# Patient Record
Sex: MALE | Race: WHITE | NOT HISPANIC OR LATINO | Employment: FULL TIME | URBAN - METROPOLITAN AREA
[De-identification: names, ages, dates, MRNs, and addresses within clinical notes are randomized per-mention and may not be internally consistent; named-entity substitution may affect disease eponyms.]

---

## 2018-01-26 ENCOUNTER — PATIENT OUTREACH (OUTPATIENT)
Dept: INTERNAL MEDICINE | Facility: CLINIC | Age: 33
End: 2018-01-26

## 2018-01-26 NOTE — PROGRESS NOTES
[facility] is committed to your overall health.  To help you get the most out of each of your visits, we will review your information to make sure you are up to date on all of your recommended tests and/or procedures.       Your PCP   found that you may be due for:       Health Maintenance Due   Topic Date Due    TETANUS VACCINE  06/29/2002    Influenza Vaccine  08/01/2017             If you have had any of the above done at another facility, please bring the records or information with you so that your record at [facility] will be complete.  If you would like to schedule any of these, please contact me.     If you are currently taking medication, please bring it with you to your appointment for review.     Also, if you have any type of Advanced Directives, please bring them with you to your office visit so we may scan them into your chart.       Thank you for Choosing [facility] for your healthcare needs.        Additional Information  If you have questions, you can email [email or web address] or call [phone]  to talk to our [facility] staff. Remember, [facility] is NOT to be used for urgent needs. For medical emergencies, dial 911.

## 2018-01-29 ENCOUNTER — OFFICE VISIT (OUTPATIENT)
Dept: INTERNAL MEDICINE | Facility: CLINIC | Age: 33
End: 2018-01-29
Attending: FAMILY MEDICINE
Payer: COMMERCIAL

## 2018-01-29 VITALS
BODY MASS INDEX: 26.74 KG/M2 | SYSTOLIC BLOOD PRESSURE: 110 MMHG | DIASTOLIC BLOOD PRESSURE: 68 MMHG | HEART RATE: 90 BPM | WEIGHT: 186.75 LBS | HEIGHT: 70 IN

## 2018-01-29 DIAGNOSIS — Z00.00 PREVENTATIVE HEALTH CARE: Primary | ICD-10-CM

## 2018-01-29 PROCEDURE — 99999 PR PBB SHADOW E&M-EST. PATIENT-LVL III: CPT | Mod: PBBFAC,,, | Performed by: FAMILY MEDICINE

## 2018-01-29 PROCEDURE — 99395 PREV VISIT EST AGE 18-39: CPT | Mod: S$GLB,,, | Performed by: FAMILY MEDICINE

## 2018-01-29 NOTE — PROGRESS NOTES
"CHIEF COMPLAINT: Annual Exam    HISTORY OF PRESENT ILLNESS: The patient is a generally healthy 32 year-old WM.  The patient has no specific complaints today.  He recently underwent an insurance exam.  He was declined due to what he believes is elevated cholesterol.  It could've been elevated blood pressure although his blood pressure is normal today.  In any event were going to get a cholesterol panel and find out.  He does have a  at home so he is a little bit more tired than usual.     REVIEW OF SYSTEMS:  GENERAL: No fever, chills or weight loss.  SKIN: No rashes, itching or changes in color or texture of skin.  HEAD: No headaches or recent head trauma.  EYES: Visual acuity fine. No photophobia, ocular pain or diplopia.  EARS: Denies ear pain, discharge or vertigo.  NOSE: No loss of smell, no epistaxis or postnasal drip.  MOUTH & THROAT: No hoarseness or change in voice. No excessive gum bleeding.  NODES: Denies swollen glands.  CHEST: Denies KAPOOR, cyanosis, wheezing, cough and sputum production.  CARDIOVASCULAR: Denies chest pain, PND, orthopnea or reduced exercise tolerance.  ABDOMEN: Appetite fine. No weight loss. Denies diarrhea, abdominal pain, hematemesis or blood in stool.  URINARY: No flank pain, dysuria or hematuria.  PERIPHERAL VASCULAR: No claudication or cyanosis.  MUSCULOSKELETAL: No joint stiffness or swelling. Denies back pain.  NEUROLOGIC: No history of seizures, paralysis, alteration of gait or coordination.    SOCIAL HISTORY: The patient does not smoke.  The patient consumes alcohol socially.  The patient is happily . He works in [employer].  He is originally from [place].    PHYSICAL EXAMINATION:     Blood pressure 110/68, pulse 90, height 5' 10" (1.778 m), weight 84.7 kg (186 lb 11.7 oz).    APPEARANCE: Well nourished, well developed, in no acute distress.    HEAD: Normocephalic, atraumatic.  EYES: PERRL. EOMI.  Conjunctivae without injection and  anicteric  EARS: TM's " intact. Light reflex normal. No retraction or perforation.    NOSE: Mucosa pink. Airway clear.  MOUTH & THROAT: No tonsillar enlargement. No pharyngeal erythema or exudate. No stridor.  NECK: Supple.   NODES: No cervical, axillary or inguinal lymph node enlargement.  CHEST: Lungs clear to auscultation.  No retractions are noted.  No rales or rhonchi are present.  CARDIOVASCULAR: Normal S1, S2. No rubs, murmurs or gallops.  ABDOMEN: Bowel sounds normal. Not distended. Soft. No tenderness or masses.  No ascites is noted.  MUSCULOSKELETAL:  There is no clubbing, cyanosis, or edema of the extremities x4.  There is full range of motion of the lumbar spine.  There is full range of motion of the extremities x4.  There is no deformity noted.    NEUROLOGIC:       Normal speech development.      Hearing normal.      Normal gait.      Motor and sensory exams grossly normal.      DTR's normal.  PSYCHIATRIC: Patient is alert and oriented x3.  Thought processes are all normal.  There is no homicidality.  There is no suicidality.  There is no evidence of psychosis.    LABORATORY/RADIOLOGY:   Chart reviewed.  We will update blood work today.    ASSESSMENT:   Normal physical exam in an apparently healthy individual  Abnormal encounter for insurance policy    PLAN:  Follow up blood work to determine if he does have elevated cholesterol as reported by insurance company physical    Return to clinic in one year.

## 2018-02-07 ENCOUNTER — TELEPHONE (OUTPATIENT)
Dept: INTERNAL MEDICINE | Facility: CLINIC | Age: 33
End: 2018-02-07

## 2018-02-07 NOTE — TELEPHONE ENCOUNTER
----- Message from Marilyn MCALLISTER Adi sent at 2/6/2018  7:03 PM CST -----  Contact: PT Portal Request  Appointment Request From: Willy Branham    With Provider: Maldonado Mtz MD [[facility]]    Would Accept With:Only the person I've selected    Preferred Date Range: Any date 2/7/2018 or later    Preferred Times: Any    Reason for visit: Request an Appt    Comments:  Hi,  I recently came in for an annual physical with Dr Mtz on Jan 29. I unfortunately needed to cancel my blood work appt that was on Feb 1 due to a cold.    Would it be possible for you to reschedule the blood work for me for either 7-830am on Feb 7 or Feb 16?

## 2018-02-16 ENCOUNTER — LAB VISIT (OUTPATIENT)
Dept: LAB | Facility: OTHER | Age: 33
End: 2018-02-16
Attending: FAMILY MEDICINE
Payer: COMMERCIAL

## 2018-02-16 DIAGNOSIS — Z00.00 PREVENTATIVE HEALTH CARE: ICD-10-CM

## 2018-02-16 LAB
ALBUMIN SERPL BCP-MCNC: 4.2 G/DL
ALP SERPL-CCNC: 57 U/L
ALT SERPL W/O P-5'-P-CCNC: 29 U/L
ANION GAP SERPL CALC-SCNC: 11 MMOL/L
AST SERPL-CCNC: 27 U/L
BILIRUB SERPL-MCNC: 0.5 MG/DL
BUN SERPL-MCNC: 16 MG/DL
CALCIUM SERPL-MCNC: 9.8 MG/DL
CHLORIDE SERPL-SCNC: 102 MMOL/L
CHOLEST SERPL-MCNC: 220 MG/DL
CHOLEST/HDLC SERPL: 5.6 {RATIO}
CO2 SERPL-SCNC: 27 MMOL/L
CREAT SERPL-MCNC: 1 MG/DL
EST. GFR  (AFRICAN AMERICAN): >60 ML/MIN/1.73 M^2
EST. GFR  (NON AFRICAN AMERICAN): >60 ML/MIN/1.73 M^2
ESTIMATED AVG GLUCOSE: 103 MG/DL
GLUCOSE SERPL-MCNC: 85 MG/DL
HBA1C MFR BLD HPLC: 5.2 %
HDLC SERPL-MCNC: 39 MG/DL
HDLC SERPL: 17.7 %
LDLC SERPL CALC-MCNC: 147 MG/DL
NONHDLC SERPL-MCNC: 181 MG/DL
POTASSIUM SERPL-SCNC: 4.4 MMOL/L
PROT SERPL-MCNC: 7.5 G/DL
SODIUM SERPL-SCNC: 140 MMOL/L
TRIGL SERPL-MCNC: 170 MG/DL
TSH SERPL DL<=0.005 MIU/L-ACNC: 0.94 UIU/ML

## 2018-02-16 PROCEDURE — 80053 COMPREHEN METABOLIC PANEL: CPT

## 2018-02-16 PROCEDURE — 83036 HEMOGLOBIN GLYCOSYLATED A1C: CPT

## 2018-02-16 PROCEDURE — 80061 LIPID PANEL: CPT

## 2018-02-16 PROCEDURE — 84443 ASSAY THYROID STIM HORMONE: CPT

## 2018-02-16 PROCEDURE — 36415 COLL VENOUS BLD VENIPUNCTURE: CPT

## 2018-02-20 ENCOUNTER — TELEPHONE (OUTPATIENT)
Dept: INTERNAL MEDICINE | Facility: CLINIC | Age: 33
End: 2018-02-20

## 2018-02-20 NOTE — TELEPHONE ENCOUNTER
----- Message from Maldonado tMz MD sent at 2/16/2018  3:56 PM CST -----  Blood work looks good.  I am not sure what the insurance company saw but there is no serious abnormality on the blood work.

## 2018-11-29 ENCOUNTER — OFFICE VISIT (OUTPATIENT)
Dept: URGENT CARE | Facility: CLINIC | Age: 33
End: 2018-11-29
Payer: COMMERCIAL

## 2018-11-29 VITALS
SYSTOLIC BLOOD PRESSURE: 115 MMHG | HEIGHT: 70 IN | OXYGEN SATURATION: 98 % | HEART RATE: 72 BPM | BODY MASS INDEX: 26.48 KG/M2 | TEMPERATURE: 98 F | WEIGHT: 185 LBS | DIASTOLIC BLOOD PRESSURE: 67 MMHG | RESPIRATION RATE: 18 BRPM

## 2018-11-29 DIAGNOSIS — H10.33 ACUTE BACTERIAL CONJUNCTIVITIS OF BOTH EYES: Primary | ICD-10-CM

## 2018-11-29 DIAGNOSIS — Z76.89 ENCOUNTER TO ESTABLISH CARE: ICD-10-CM

## 2018-11-29 PROCEDURE — 3008F BODY MASS INDEX DOCD: CPT | Mod: CPTII,S$GLB,, | Performed by: NURSE PRACTITIONER

## 2018-11-29 PROCEDURE — 99214 OFFICE O/P EST MOD 30 MIN: CPT | Mod: S$GLB,,, | Performed by: NURSE PRACTITIONER

## 2018-11-29 RX ORDER — POLYMYXIN B SULFATE AND TRIMETHOPRIM 1; 10000 MG/ML; [USP'U]/ML
1 SOLUTION OPHTHALMIC EVERY 6 HOURS
Qty: 10 ML | Refills: 0 | Status: SHIPPED | OUTPATIENT
Start: 2018-11-29 | End: 2018-12-06

## 2018-11-30 NOTE — PROGRESS NOTES
"Subjective:       Patient ID: Willy Branham is a 33 y.o. male.    Vitals:  height is 5' 10" (1.778 m) and weight is 83.9 kg (185 lb). His temperature is 98.2 °F (36.8 °C). His blood pressure is 115/67 and his pulse is 72. His respiration is 18 and oxygen saturation is 98%.     Chief Complaint: Eye Problem    Pt c/o possible pink eye   Denies recent URI   Reports yellow drainage from bilateral eyes with redness throughout the day.   He does not wear contacts or glasses. He does not have an eye doctor nor has he had a recent exam   Right eye is worse than left.       Eye Problem    Both eyes are affected.This is a new problem. The current episode started in the past 7 days. The problem occurs constantly. The problem has been gradually improving. There was no injury mechanism. The pain is at a severity of 2/10. The pain is mild. There is known exposure to pink eye. He does not wear contacts. Associated symptoms include blurred vision, an eye discharge, eye redness and itching. Pertinent negatives include no double vision, fever, nausea, photophobia or vomiting. He has tried nothing for the symptoms. The treatment provided no relief.       Constitution: Negative for chills and fever.   HENT: Negative for congestion and sinus pain.    Eyes: Positive for eye discharge, eye itching, eye redness and blurred vision. Negative for eye trauma, foreign body in eye, eye pain, photophobia, vision loss, double vision and eyelid swelling.   Gastrointestinal: Negative for nausea and vomiting.   Skin: Negative for rash.   Allergic/Immunologic: Negative for seasonal allergies and itching.   Neurological: Negative for headaches.       Objective:      Physical Exam   Constitutional: He is oriented to person, place, and time. He appears well-developed and well-nourished. No distress.   NAD  Afebrile    HENT:   Head: Normocephalic and atraumatic.   Right Ear: External ear normal.   Left Ear: External ear normal.   Nose: Nose normal. "   Mouth/Throat: Oropharynx is clear and moist.   Eyes: EOM and lids are normal. Pupils are equal, round, and reactive to light. Lids are everted and swept, no foreign bodies found. Right eye exhibits discharge (yellow ). Right eye exhibits no chemosis, no exudate and no hordeolum. No foreign body present in the right eye. Left eye exhibits discharge (yellow). Left eye exhibits no chemosis, no exudate and no hordeolum. No foreign body present in the left eye. Right conjunctiva is injected. Left conjunctiva is injected. No scleral icterus. Right eye exhibits normal extraocular motion and no nystagmus. Left eye exhibits normal extraocular motion and no nystagmus.   Slit lamp exam:       The right eye shows no corneal abrasion, no corneal ulcer, no foreign body, no hyphema, no fluorescein uptake and no anterior chamber bulge.        The left eye shows no corneal abrasion, no corneal ulcer, no foreign body, no hyphema, no fluorescein uptake and no anterior chamber bulge.   Pupils are 3 mm and brisk bilaterally    Neck: Trachea normal, normal range of motion, full passive range of motion without pain and phonation normal. Neck supple.   Musculoskeletal: Normal range of motion.   Neurological: He is alert and oriented to person, place, and time.   Skin: Skin is warm, dry and intact. He is not diaphoretic.   Psychiatric: He has a normal mood and affect. His speech is normal and behavior is normal. Judgment and thought content normal. Cognition and memory are normal.   Nursing note and vitals reviewed.          Vision Screening   Edited by: Ritchie Byrnes MA    Right eye Left eye Both eyes   Without correction 20/25 20/20 20/15       Assessment:       1. Acute bacterial conjunctivitis of both eyes    2. Encounter to establish care        Plan:         Acute bacterial conjunctivitis of both eyes  -     polymyxin B sulf-trimethoprim (POLYTRIM) 10,000 unit- 1 mg/mL Drop; Place 1 drop into both eyes every 6 (six) hours. for 7 days   Dispense: 10 mL; Refill: 0  -     Ambulatory referral to Ophthalmology    Encounter to establish care  -     Ambulatory referral to Ophthalmology          Patient Instructions   Referral to ophthalmology. Please call [phone] to schedule an appointment.     Please return here or go to the Emergency Department for any concerns or worsening of condition.  If you were prescribed antibiotics, please take them to completion.  If you were prescribed a narcotic medication, do not drive or operate heavy equipment or machinery while taking these medications.  Please follow up with your primary care doctor or specialist as needed.    If you  smoke, please stop smoking.      Conjunctivitis, Nonspecific    The membrane that covers the white part of your eye (the conjunctiva) is inflamed. Inflammation happens when your body responds to an injury, allergic reaction, infection, or illness. Symptoms of inflammation in the eye may include redness, irritation, itching, swelling, or burning. These symptoms should go away within the next 24 hours. Conjunctivitis may be related to a particle that was in your eye. If so, it may wash out with your tears or irrigation treatment. Being exposed to liquid chemicals or fumes may also cause this reaction.   Home care  · Apply a cold pack (ice in a plastic bag, wrapped in a towel) over the eye for 20 minutes at a time. This will reduce pain.  · Artificial tears may be prescribed to reduce irritation or redness.  These should be used 3 to 4 times a day.  · You may use acetaminophen or ibuprofen to control pain, unless another medicine was prescribed.(Note: If you have chronic liver or kidney disease, or if you have ever had a stomach ulcer or gastrointestinal bleeding, talk with your healthcare provider before using these medicines.)  Follow-up care  Follow up with your healthcare provider, or as advised.  When to seek medical advice  Call your healthcare provider right away if any of these  occur:  · Increased eyelid swelling  · Increased eye pain  · Increased redness or drainage from the eye  · Increased blurry vision or increased sensitivity to light  · Failure of normal vision to return within 24 to 48 hours  Date Last Reviewed: 6/14/2015  © 7397-5734 [employer]. [address] [place] [address]. All rights reserved. This information is not intended as a substitute for professional medical care. Always follow your healthcare professional's instructions.

## 2018-11-30 NOTE — PATIENT INSTRUCTIONS
Referral to ophthalmology. Please call [phone] to schedule an appointment.     Please return here or go to the Emergency Department for any concerns or worsening of condition.  If you were prescribed antibiotics, please take them to completion.  If you were prescribed a narcotic medication, do not drive or operate heavy equipment or machinery while taking these medications.  Please follow up with your primary care doctor or specialist as needed.    If you  smoke, please stop smoking.      Conjunctivitis, Nonspecific    The membrane that covers the white part of your eye (the conjunctiva) is inflamed. Inflammation happens when your body responds to an injury, allergic reaction, infection, or illness. Symptoms of inflammation in the eye may include redness, irritation, itching, swelling, or burning. These symptoms should go away within the next 24 hours. Conjunctivitis may be related to a particle that was in your eye. If so, it may wash out with your tears or irrigation treatment. Being exposed to liquid chemicals or fumes may also cause this reaction.   Home care  · Apply a cold pack (ice in a plastic bag, wrapped in a towel) over the eye for 20 minutes at a time. This will reduce pain.  · Artificial tears may be prescribed to reduce irritation or redness.  These should be used 3 to 4 times a day.  · You may use acetaminophen or ibuprofen to control pain, unless another medicine was prescribed.(Note: If you have chronic liver or kidney disease, or if you have ever had a stomach ulcer or gastrointestinal bleeding, talk with your healthcare provider before using these medicines.)  Follow-up care  Follow up with your healthcare provider, or as advised.  When to seek medical advice  Call your healthcare provider right away if any of these occur:  · Increased eyelid swelling  · Increased eye pain  · Increased redness or drainage from the eye  · Increased blurry vision or increased sensitivity to light  · Failure of  normal vision to return within 24 to 48 hours  Date Last Reviewed: 6/14/2015  © 3631-7925 [employer]. [address] [place] [address], [place] [address]. All rights reserved. This information is not intended as a substitute for professional medical care. Always follow your healthcare professional's instructions.

## 2019-05-08 ENCOUNTER — OFFICE VISIT (OUTPATIENT)
Dept: INTERNAL MEDICINE | Facility: CLINIC | Age: 34
End: 2019-05-08
Payer: COMMERCIAL

## 2019-05-08 VITALS
HEIGHT: 70 IN | BODY MASS INDEX: 27.9 KG/M2 | DIASTOLIC BLOOD PRESSURE: 72 MMHG | SYSTOLIC BLOOD PRESSURE: 108 MMHG | HEART RATE: 78 BPM | WEIGHT: 194.88 LBS

## 2019-05-08 DIAGNOSIS — Z00.00 WELLNESS EXAMINATION: Primary | ICD-10-CM

## 2019-05-08 DIAGNOSIS — R06.83 SNORING: ICD-10-CM

## 2019-05-08 PROCEDURE — 99999 PR PBB SHADOW E&M-EST. PATIENT-LVL III: ICD-10-PCS | Mod: PBBFAC,,, | Performed by: INTERNAL MEDICINE

## 2019-05-08 PROCEDURE — 99999 PR PBB SHADOW E&M-EST. PATIENT-LVL III: CPT | Mod: PBBFAC,,, | Performed by: INTERNAL MEDICINE

## 2019-05-08 PROCEDURE — 99395 PR PREVENTIVE VISIT,EST,18-39: ICD-10-PCS | Mod: S$GLB,,, | Performed by: INTERNAL MEDICINE

## 2019-05-08 PROCEDURE — 99395 PREV VISIT EST AGE 18-39: CPT | Mod: S$GLB,,, | Performed by: INTERNAL MEDICINE

## 2019-05-08 NOTE — PROGRESS NOTES
Subjective:       Patient ID: Willy Branham is a 33 y.o. male.    Chief Complaint: Annual Exam    Pt here for annual exam. Counseled on exercise goals.     He reports not feeling rested upon awakening. He is able to go to sleep easily and maintain sleep but still not feeling rested. He has some daytime sleepiness as well. He does snore but not witnessed apnea.     Review of Systems   Constitutional: Negative for activity change, fatigue, fever and unexpected weight change.   HENT: Negative for congestion, hearing loss, rhinorrhea, sore throat and trouble swallowing.    Eyes: Negative for discharge and visual disturbance.   Respiratory: Negative for cough, chest tightness, shortness of breath and wheezing.    Cardiovascular: Negative for chest pain, palpitations and leg swelling.   Gastrointestinal: Positive for diarrhea. Negative for abdominal pain, blood in stool, constipation and vomiting.   Endocrine: Negative for polydipsia and polyuria.   Genitourinary: Negative for difficulty urinating, dysuria, hematuria and urgency.   Musculoskeletal: Negative for arthralgias, joint swelling and neck pain.   Skin: Negative for color change and rash.   Neurological: Negative for dizziness, syncope, weakness and headaches.   Hematological: Negative for adenopathy.   Psychiatric/Behavioral: Negative for confusion and dysphoric mood.       Objective:      Physical Exam   Constitutional: He is oriented to person, place, and time. He appears well-developed and well-nourished.   HENT:   Head: Normocephalic and atraumatic.   Right Ear: External ear normal.   Left Ear: External ear normal.   Mouth/Throat: Oropharynx is clear and moist. No oropharyngeal exudate.   Eyes: Pupils are equal, round, and reactive to light. Conjunctivae and EOM are normal.   Neck: Neck supple. Carotid bruit is not present. No thyromegaly present.   Cardiovascular: Normal rate, regular rhythm, S1 normal, S2 normal, normal heart sounds and intact distal  pulses.   Pulmonary/Chest: Effort normal and breath sounds normal.   Abdominal: Soft. Bowel sounds are normal. He exhibits no mass. There is no hepatosplenomegaly. There is no tenderness.   Genitourinary: Testes normal and penis normal.   Genitourinary Comments: Small subcutaneous cyst L scrotum; no drainage/fluctuance/erythema   Musculoskeletal: He exhibits no edema.   Lymphadenopathy:     He has no cervical adenopathy.   Neurological: He is alert and oriented to person, place, and time. No cranial nerve deficit.   Psychiatric: He has a normal mood and affect. His behavior is normal.       Assessment:       1. Wellness examination    2. Snoring        Plan:       1. Prior labs reviewed  2. Sleep med referral  3. Reassured re: scrotal skin cyst but offered referral if wanting which he declined

## 2019-06-13 ENCOUNTER — OFFICE VISIT (OUTPATIENT)
Dept: SLEEP MEDICINE | Facility: CLINIC | Age: 34
End: 2019-06-13
Payer: COMMERCIAL

## 2019-06-13 VITALS
HEART RATE: 79 BPM | DIASTOLIC BLOOD PRESSURE: 67 MMHG | SYSTOLIC BLOOD PRESSURE: 117 MMHG | BODY MASS INDEX: 27.81 KG/M2 | WEIGHT: 194.25 LBS | HEIGHT: 70 IN

## 2019-06-13 DIAGNOSIS — G47.30 SLEEP APNEA, UNSPECIFIED TYPE: Primary | ICD-10-CM

## 2019-06-13 PROCEDURE — 99204 OFFICE O/P NEW MOD 45 MIN: CPT | Mod: S$GLB,,, | Performed by: INTERNAL MEDICINE

## 2019-06-13 PROCEDURE — 99999 PR PBB SHADOW E&M-EST. PATIENT-LVL III: CPT | Mod: PBBFAC,,, | Performed by: INTERNAL MEDICINE

## 2019-06-13 PROCEDURE — 99999 PR PBB SHADOW E&M-EST. PATIENT-LVL III: ICD-10-PCS | Mod: PBBFAC,,, | Performed by: INTERNAL MEDICINE

## 2019-06-13 PROCEDURE — 3008F PR BODY MASS INDEX (BMI) DOCUMENTED: ICD-10-PCS | Mod: CPTII,S$GLB,, | Performed by: INTERNAL MEDICINE

## 2019-06-13 PROCEDURE — 99204 PR OFFICE/OUTPT VISIT, NEW, LEVL IV, 45-59 MIN: ICD-10-PCS | Mod: S$GLB,,, | Performed by: INTERNAL MEDICINE

## 2019-06-13 PROCEDURE — 3008F BODY MASS INDEX DOCD: CPT | Mod: CPTII,S$GLB,, | Performed by: INTERNAL MEDICINE

## 2019-06-13 NOTE — LETTER
June 13, 2019      Matthias Ellis MD  [address]  [facility] [address]           [facility] [place] [facility] [address]  [address]  [facility] [address]  Phone: [phone]          Patient: Willy Branham   MR Number: [id number]   YOB: 1985   Date of Visit: 6/13/2019       Dear Dr. Matthias Ellis:    Thank you for referring Willy Branham to me for evaluation. Attached you will find relevant portions of my assessment and plan of care.    If you have questions, please do not hesitate to call me. I look forward to following Willy Branham along with you.    Sincerely,    Irene Reeves MD    Enclosure  CC:  No Recipients    If you would like to receive this communication electronically, please contact [email or web address] or [phone] to request more information on [facility] access.    For providers and/or their staff who would like to refer a patient to [facility], please contact us through our one-stop-shop provider referral line, [facility], at [phone].    If you feel you have received this communication in error or would no longer like to receive these types of communications, please e-mail [email or web address]

## 2019-06-13 NOTE — PATIENT INSTRUCTIONS
Sleep Hygiene Practices    1. Try going to bed only when you are drowsy.    ?    2. If you are unable to fall asleep or stay asleep, leave your bedroom and engage in a quiet activity elsewhere. Do not permit yourself to fall asleep outside the bedroom. Return to bed when and only when you are sleepy. Repeat this process as often as necessary throughout night.    3. Maintain regular wake-up time, even on days off work & weekends    4. Use your bedroom for sleep and sex    5. Avoid napping during the daytime. If daytime sleepiness becomes overwhelming, limit nap time to a single nap of less than 1hr, no later than 3pm.    6. Distract your mind. Avoid clock watching. Lying in bed unable to sleep and frustrated needs to be avoided. Try reading or watching a videotape or listening to books on tape. It may be necessary to go into another room to do these.    7. Avoid caffeine within 4-6hrs of bedtime    8. Avoid use of nicotine close to bedtime    9. do not drink alcoholic beverages within 4-6hrs of bedtime    10. While a light snack before bedtime can help promote sound sleep, avoid large meals.    11. Obtain regular exercise, but avoid strenuous exercise within 4hrs of bedtime    12. Minimize light, noise, and extremes in temperature in the bedroom.    13. Precautions: The patient was advised to abstain from driving should they feel sleepy or drowsy.  ?  Linda or Cecil will contact you to schedule your sleep study. Their number is [phone] (ext 2). The [facility] is located on 7th floor of the [facility].    We will call you when the sleep study results are ready - if you have not heard from us by 2 weeks from the date of the study, please call [phone] (ext 1).    You are advised to abstain from driving should you feel sleepy or drowsy.        * This information is provided had been directly obtained from the American Academy of [employer] wellness booklet on sleep hygiene. ([address]  [facility], [address] [place] [address]

## 2019-06-13 NOTE — PROGRESS NOTES
This 33 y.o. male patient presents for the evaluation of possible obstructive sleep apnea.    Referring Provider: Dr. Matthias Ellis     Pt is referred for evaluation of DEWAYNE by PCP.  He had a home sleep study done 6 years ago was negative for DEWAYNE.  Pt has been fatigued and tired for the past 4-5  years. +snoring , negative  witnessed apneas, negative waking up gasping for air .  wakes 3 times unknown reason/nocturia once a week , takes a min's to go  back to sleep, + tired on waking up all the time,  3-4  cups of coffee a day, - urge to move legs /   leg kicking. + on and off nasal congestion. - cataplexy,-  hallucination,- sleep paralysis. - acting out dreams. - vivid dream.  Pt prefer to sleep on the back.       Pt denies having head injury and hospitalization due to viral infection. - Sleep walking/talking. Memory is good. Pt feels content.  Denies any sleep related injury. Night mare rarely.Pt does not  have  morning headache .  Pt doesn't  feel  Drowsy while driving. Pt is mouth/nose breather. Pt does not wake up to eat at night. Pt  doesn't sweat at night.Pt does not grind teeth.       EPWORTH SLEEPINESS SCALE 6/11/2019   Sitting and reading 2   Watching TV 0   Sitting, inactive in a public place (e.g. a theatre or a meeting) 0   As a passenger in a car for an hour without a break 0   Lying down to rest in the afternoon when circumstances permit 3   Sitting and talking to someone 0   Sitting quietly after a lunch without alcohol 0   In a car, while stopped for a few minutes in traffic 0   Total score 5     Sleep Clinic New Patient 6/11/2019   What time do you go to bed on a week day? (Give a range) 9-1030   What time do you go to bed on a day off? (Give a range) 930-11   How long does it take you to fall asleep? (Give a range) 5-10 minutes   On average, how many times per night do you wake up? 3   How long does it take you to fall back into sleep? (Give a range) 2-3 minutes   What time do you wake up to  start your day on a week day? (Give a range) 530-615   What time do you wake up to start your day on a day off? (Give a range) 6-8   What time do you get out of bed? (Give a range) Same times i wake up   On average, how many hours do you sleep? 7.5   On average, how many naps do you take per day? 0   Rate your sleep quality from 0 to 5 (0-poor, 5-great). 2   Have you experienced:  N/a   How much weight have you lost or gained (in lbs.) in the last year? 0   On average, how many times per night do you go to the bathroom?   1-2 times  weeks    Have you ever had a sleep study/CPAP machine/surgery for sleep apnea? Yes   Have you ever had a CPAP machine for sleep apnea? No   Have you ever had surgery for sleep apnea? No     Sleep Clinic ROS  6/11/2019   Difficulty breathing through the nose?  Yes   Sore throat or dry mouth in the morning? Sometimes   Irregular or very fast heart beat?  No   Shortness of breath?  No   Acid reflux? No   Body aches and pains?  No   Morning headaches? No   Dizziness? No   Mood changes?  No   Do you exercise?  Yes   Do you feel like moving your legs a lot?  No     MEGHAN-7 Score: (P) 4  Interpretation: (P) Normal    PHQ9 6/11/2019   Little interest or pleasure in doing things: Not at all   Feeling down, depressed or hopeless: Several days   Trouble falling asleep, staying asleep, or sleeping too much: Nearly every day   Feeling tired or having little energy: More than half the days   Poor appetite or overeating: Not at all   Feeling bad about yourself- or that you are a failure or have let yourself or family down Several days   Trouble concentrating on things, such as reading the newspaper or watching television: Not at all   Moving or speaking so slowly that other people could have noticed. Or the opposite- being so fidgety or restless that you have been moving around a lot more than usual: Not at all   Thoughts that you would be better off dead or hurting yourself in some way: Not at all   If  "you indicated you have experienced any of the aforementioned problems, how difficult have these problems made it for you to do your work, take care of things at home or get along with other people? Somewhat difficult   Total Score 7       SLEEP ROUTINE:  Bed partner: yes.  Daytime naps: on weekends 45 - hr and half and feels no change    No past medical history on file.    Past Surgical History:   Procedure Laterality Date    APPENDECTOMY      NASAL SEPTOPLASTY         Family History   Problem Relation Age of Onset    No Known Problems Mother     Cancer Father         renal cell ca    Hypertension Father     Stroke Maternal Grandmother     Cancer Paternal Grandmother         breast ca       Social History     Tobacco Use    Smoking status: Never Smoker    Smokeless tobacco: Never Used   Substance Use Topics    Alcohol use: Yes     Alcohol/week: 4.2 oz     Types: 4 Glasses of wine, 3 Cans of beer per week     Frequency: 2-3 times a week     Drinks per session: 1 or 2     Binge frequency: Less than monthly    Drug use: No       ALLERGIES: Reviewed in EPIC    CURRENT MEDICATIONS: Reviewed in EPIC    REVIEW OF SYSTEMS:  Sleep related symptoms as per HPI;       PHYSICAL EXAM:  /67   Pulse 79   Ht 5' 10" (1.778 m)   Wt 88.1 kg (194 lb 3.6 oz)   BMI 27.87 kg/m²   GENERAL: Well groomed; Normally developed;  HEENT: Conjunctivae are non-erythematous; Pupils equal, round, and reactive to light;    Nose is symmetrical; Nasal mucosa is pink and moist; Septum is midline;    Turbinates are normal; Nasal airflow is normal;    Posterior pharynx is pink; Posterior palate is normal; Modified Mallampati: IV   Uvula is absent; Tongue is normal; Tonsils +1;    Dentition is fair; No TMJ tenderness;    Jaw opening and protrusion without click and without discomfort.  NECK: Supple. No thyromegaly. No palpable nodes. Neck circumference in inches is 14.5   SKIN: On face and neck: No abrasions, no rashes, no lesions.     " No subcutaneous nodules are palpable.  RESPIRATORY: Chest is clear to auscultation.     Normal chest expansion and non-labored breathing at rest.  CARDIOVASCULAR: Normal S1, S2.  No murmurs, gallops or rubs.   No carotid bruits bilaterally.  EXTREMITIES: No clubbing. No cyanosis. Edema is absent.   NEURO: Oriented to time, place and person.    Normal attention span and concentration.  Station normal. Gait normal.  PSYCH: Affect is full. Mood is normal.    ASSESSMENT:    1. Sleep Apnea, unspecified. The patient symptomatically has snoring with exam findings of a crowded oral airway with medical co-morbidities of overweight  This warrants further investigation for possible obstructive sleep apnea.    Sleep apnea, unspecified type             PLAN:  Sleep apnea unspecified:  Discussed with the patient having the risk of having DEWAYNE will do the HST, suggested to avoid supine sleep except the test day. Suggested to loose weight and to h ave good sleep hygiene and to avoid drowsy driving. The nature of this procedure and its indication was discussed with the patient.    Education: During our discussion today, we talked about the etiology of obstructive sleep apnea as well as the potential ramifications of untreated sleep apnea, which could include daytime sleepiness, hypertension, heart disease and/or stroke.  We discussed potential treatment options, which could include weight loss, body positioning, use of an oral appliance, continuous positive airway pressure (CPAP), EPAP, or referral for surgical consideration.    Precautions: The patient was advised to abstain from driving should they feel sleepy or drowsy.    Follow up:  6 weeks .             Irene Reeves MD, FACP  Phone: [phone]  Fax: [phone]

## 2019-06-19 ENCOUNTER — TELEPHONE (OUTPATIENT)
Dept: SLEEP MEDICINE | Facility: OTHER | Age: 34
End: 2019-06-19

## 2019-06-21 ENCOUNTER — HOSPITAL ENCOUNTER (OUTPATIENT)
Dept: SLEEP MEDICINE | Facility: OTHER | Age: 34
Discharge: HOME | End: 2019-06-21
Attending: INTERNAL MEDICINE
Payer: COMMERCIAL

## 2019-06-21 ENCOUNTER — TELEPHONE (OUTPATIENT)
Dept: SLEEP MEDICINE | Facility: OTHER | Age: 34
End: 2019-06-21

## 2019-06-21 DIAGNOSIS — G47.33 OSA (OBSTRUCTIVE SLEEP APNEA): ICD-10-CM

## 2019-06-21 DIAGNOSIS — G47.30 SLEEP APNEA, UNSPECIFIED TYPE: ICD-10-CM

## 2019-06-21 PROCEDURE — 95800 SLP STDY UNATTENDED: CPT

## 2019-06-24 PROCEDURE — 95800 SLP STDY UNATTENDED: CPT | Mod: 26,,, | Performed by: INTERNAL MEDICINE

## 2019-06-24 PROCEDURE — 95800 PR SLEEP STUDY, UNATTENDED, RECORD HEART RATE/O2 SAT/RESP ANAL/SLEEP TIME: ICD-10-PCS | Mod: 26,,, | Performed by: INTERNAL MEDICINE

## 2019-07-08 ENCOUNTER — OFFICE VISIT (OUTPATIENT)
Dept: SLEEP MEDICINE | Facility: CLINIC | Age: 34
End: 2019-07-08
Payer: COMMERCIAL

## 2019-07-08 VITALS
HEART RATE: 78 BPM | DIASTOLIC BLOOD PRESSURE: 79 MMHG | BODY MASS INDEX: 27.34 KG/M2 | WEIGHT: 190.94 LBS | HEIGHT: 70 IN | SYSTOLIC BLOOD PRESSURE: 122 MMHG

## 2019-07-08 DIAGNOSIS — G47.33 OSA (OBSTRUCTIVE SLEEP APNEA): Primary | ICD-10-CM

## 2019-07-08 PROCEDURE — 99214 OFFICE O/P EST MOD 30 MIN: CPT | Mod: S$GLB,,, | Performed by: INTERNAL MEDICINE

## 2019-07-08 PROCEDURE — 3008F PR BODY MASS INDEX (BMI) DOCUMENTED: ICD-10-PCS | Mod: CPTII,S$GLB,, | Performed by: INTERNAL MEDICINE

## 2019-07-08 PROCEDURE — 99214 PR OFFICE/OUTPT VISIT, EST, LEVL IV, 30-39 MIN: ICD-10-PCS | Mod: S$GLB,,, | Performed by: INTERNAL MEDICINE

## 2019-07-08 PROCEDURE — 3008F BODY MASS INDEX DOCD: CPT | Mod: CPTII,S$GLB,, | Performed by: INTERNAL MEDICINE

## 2019-07-08 PROCEDURE — 99999 PR PBB SHADOW E&M-EST. PATIENT-LVL III: CPT | Mod: PBBFAC,,, | Performed by: INTERNAL MEDICINE

## 2019-07-08 PROCEDURE — 99999 PR PBB SHADOW E&M-EST. PATIENT-LVL III: ICD-10-PCS | Mod: PBBFAC,,, | Performed by: INTERNAL MEDICINE

## 2019-07-08 NOTE — PROGRESS NOTES
2019    Pt is following in regards to the DEWAYNE underwent HST showing mild DEWAYNE AHI-7 and RDI- 19. He does have dry mouth on waking. He denies morning headache on waking up. He sleeps on his back. He denies any issues with the memory and the mode. He denies nocturia. He denies drowsy driving.  He does snores.     2019 HST  Mild DEWAYNE AHI 7,RDI: 19, < 90 Spo2: 0.0%, Mean Spo2: 96.1 %, time snorin.0 %      EPWORTH SLEEPINESS SCALE 2019   Sitting and reading 0   Watching TV 0   Sitting, inactive in a public place (e.g. a theatre or a meeting) 0   As a passenger in a car for an hour without a break 0   Lying down to rest in the afternoon when circumstances permit 0   Sitting and talking to someone 0   Sitting quietly after a lunch without alcohol 0   In a car, while stopped for a few minutes in traffic 0   Total score 0       2019    This 34 y.o. male patient presents for the evaluation of possible obstructive sleep apnea.    Referring Provider: No ref. provider found     Pt is referred for evaluation of DEWAYNE by PCP.  He had a home sleep study done 6 years ago was negative for DEWAYNE.  Pt has been fatigued and tired for the past 4-5  years. +snoring , negative  witnessed apneas, negative waking up gasping for air .  wakes 3 times unknown reason/nocturia once a week , takes a min's to go  back to sleep, + tired on waking up all the time,  3-4  cups of coffee a day, - urge to move legs /   leg kicking. + on and off nasal congestion. - cataplexy,-  hallucination,- sleep paralysis. - acting out dreams. - vivid dream.  Pt prefer to sleep on the back.       Pt denies having head injury and hospitalization due to viral infection. - Sleep walking/talking. Memory is good. Pt feels content.  Denies any sleep related injury. Night mare rarely.Pt does not  have  morning headache .  Pt doesn't  feel  Drowsy while driving. Pt is mouth/nose breather. Pt does not wake up to eat at night. Pt  doesn't sweat at night.Pt  does not grind teeth.       EPWORTH SLEEPINESS SCALE 6/11/2019   Sitting and reading 2   Watching TV 0   Sitting, inactive in a public place (e.g. a theatre or a meeting) 0   As a passenger in a car for an hour without a break 0   Lying down to rest in the afternoon when circumstances permit 3   Sitting and talking to someone 0   Sitting quietly after a lunch without alcohol 0   In a car, while stopped for a few minutes in traffic 0   Total score 5     Sleep Clinic New Patient 6/11/2019   What time do you go to bed on a week day? (Give a range) 9-1030   What time do you go to bed on a day off? (Give a range) 930-11   How long does it take you to fall asleep? (Give a range) 5-10 minutes   On average, how many times per night do you wake up? 3   How long does it take you to fall back into sleep? (Give a range) 2-3 minutes   What time do you wake up to start your day on a week day? (Give a range) 530-615   What time do you wake up to start your day on a day off? (Give a range) 6-8   What time do you get out of bed? (Give a range) Same times i wake up   On average, how many hours do you sleep? 7.5   On average, how many naps do you take per day? 0   Rate your sleep quality from 0 to 5 (0-poor, 5-great). 2   Have you experienced:  N/a   How much weight have you lost or gained (in lbs.) in the last year? 0   On average, how many times per night do you go to the bathroom?   1-2 times  weeks    Have you ever had a sleep study/CPAP machine/surgery for sleep apnea? Yes   Have you ever had a CPAP machine for sleep apnea? No   Have you ever had surgery for sleep apnea? No     Sleep Clinic ROS  6/11/2019   Difficulty breathing through the nose?  Yes   Sore throat or dry mouth in the morning? Sometimes   Irregular or very fast heart beat?  No   Shortness of breath?  No   Acid reflux? No   Body aches and pains?  No   Morning headaches? No   Dizziness? No   Mood changes?  No   Do you exercise?  Yes   Do you feel like moving your  legs a lot?  No             PHQ9 6/11/2019   Little interest or pleasure in doing things: Not at all   Feeling down, depressed or hopeless: Several days   Trouble falling asleep, staying asleep, or sleeping too much: Nearly every day   Feeling tired or having little energy: More than half the days   Poor appetite or overeating: Not at all   Feeling bad about yourself- or that you are a failure or have let yourself or family down Several days   Trouble concentrating on things, such as reading the newspaper or watching television: Not at all   Moving or speaking so slowly that other people could have noticed. Or the opposite- being so fidgety or restless that you have been moving around a lot more than usual: Not at all   Thoughts that you would be better off dead or hurting yourself in some way: Not at all   If you indicated you have experienced any of the aforementioned problems, how difficult have these problems made it for you to do your work, take care of things at home or get along with other people? Somewhat difficult   Total Score 7       SLEEP ROUTINE:  Bed partner: yes.  Daytime naps: on weekends 45 - hr and half and feels no change    No past medical history on file.    Past Surgical History:   Procedure Laterality Date    APPENDECTOMY      NASAL SEPTOPLASTY      UVULOPALATOPHARYNGOPLASTY         Family History   Problem Relation Age of Onset    No Known Problems Mother     Cancer Father         renal cell ca    Hypertension Father     Stroke Maternal Grandmother     Cancer Paternal Grandmother         breast ca       Social History     Tobacco Use    Smoking status: Never Smoker    Smokeless tobacco: Never Used   Substance Use Topics    Alcohol use: Yes     Alcohol/week: 4.2 oz     Types: 4 Glasses of wine, 3 Cans of beer per week     Frequency: 2-3 times a week     Drinks per session: 1 or 2     Binge frequency: Less than monthly    Drug use: No       ALLERGIES: Reviewed in EPIC    CURRENT  "MEDICATIONS: Reviewed in EPIC    REVIEW OF SYSTEMS:  Sleep related symptoms as per HPI;       PHYSICAL EXAM:  Ht 5' 10" (1.778 m)   Wt 86.6 kg (190 lb 14.7 oz)   BMI 27.39 kg/m²    GENERAL: Well groomed; Normally developed;  Physical Exam  Neck size: 14.5  inch  Oral: espitia IV, high arch, mild over bite.  Nose: mild nasal congestion b/l  CVS: S1+S2 ,negative murmur/ gallop, regularly regular  Lungs: CTA B/L  Abdomen: BS+, no guarding, - rigidity.  Ext: negative pedal edema B/L LE     ASSESSMENT:    1. Sleep Apnea, unspecified. The patient symptomatically has snoring with exam findings of a crowded oral airway with medical co-morbidities of overweight  underwent HST showing mild DEWAYNE AHI-7 and RDI- 19.  This warrants treatment for obstructive sleep apnea.    DEWAYNE (obstructive sleep apnea)             PLAN:  Sleep apnea :  Discussed with the patient the results of the HST. Has mild DEWAYNE AHI- 7 and RDI- 19. Discussed with the patient treatment DEWAYNE including the MAD, and PAP therapy. Provided the dental consult and the information of the dental provider. If MAD not options provided info for the PAP therapy. Suggested to avoid supine. Suggested to loose weight and to have good sleep hygiene and to avoid drowsy driving. Inform pt will need repeat sleep study if take the MAD option.       Education: During our discussion today, we talked about the etiology of obstructive sleep apnea as well as the potential ramifications of untreated sleep apnea, which could include daytime sleepiness, hypertension, heart disease and/or stroke.  We discussed potential treatment options, which could include weight loss, body positioning, use of an oral appliance, continuous positive airway pressure (CPAP), EPAP, or referral for surgical consideration.    Precautions: The patient was advised to abstain from driving should they feel sleepy or drowsy.    Follow up:  3 months             Irene Reeves MD, FACP  Phone: [phone]  Fax: " [phone]

## 2019-07-08 NOTE — PATIENT INSTRUCTIONS
Dentist options for an oral appliance    1. David Mckeon DDS ([email or web address], [place] based) [phone]   2. Sukhjinder Juarez DDS ([facility], [place] based, but an office in [facility]) [phone]   3. Amarjit Patel DDS ([facility]) [phone]   4. Rashid Villeda DDS ([place]) Jose Sahni   5. Tristan Dixon DDS ([place]) [phone]   6. [facility] [phone]

## 2019-07-25 ENCOUNTER — PATIENT MESSAGE (OUTPATIENT)
Dept: SLEEP MEDICINE | Facility: CLINIC | Age: 34
End: 2019-07-25

## 2020-12-22 ENCOUNTER — CLINICAL SUPPORT (OUTPATIENT)
Dept: URGENT CARE | Facility: CLINIC | Age: 35
End: 2020-12-22
Payer: COMMERCIAL

## 2020-12-22 DIAGNOSIS — Z11.59 SCREENING FOR VIRAL DISEASE: Primary | ICD-10-CM

## 2020-12-22 LAB
CTP QC/QA: YES
SARS-COV-2 RDRP RESP QL NAA+PROBE: NEGATIVE

## 2020-12-22 PROCEDURE — U0002: ICD-10-PCS | Mod: QW,S$GLB,, | Performed by: PHYSICIAN ASSISTANT

## 2020-12-22 PROCEDURE — U0002 COVID-19 LAB TEST NON-CDC: HCPCS | Mod: QW,S$GLB,, | Performed by: PHYSICIAN ASSISTANT

## 2021-01-05 ENCOUNTER — PATIENT MESSAGE (OUTPATIENT)
Dept: ADMINISTRATIVE | Facility: HOSPITAL | Age: 36
End: 2021-01-05

## 2021-01-06 ENCOUNTER — CLINICAL SUPPORT (OUTPATIENT)
Dept: URGENT CARE | Facility: CLINIC | Age: 36
End: 2021-01-06
Payer: COMMERCIAL

## 2021-01-06 DIAGNOSIS — Z11.59 SCREENING FOR VIRAL DISEASE: Primary | ICD-10-CM

## 2021-01-06 LAB
CTP QC/QA: YES
SARS-COV-2 RDRP RESP QL NAA+PROBE: NEGATIVE

## 2021-01-06 PROCEDURE — U0002: ICD-10-PCS | Mod: QW,S$GLB,, | Performed by: NURSE PRACTITIONER

## 2021-01-06 PROCEDURE — U0002 COVID-19 LAB TEST NON-CDC: HCPCS | Mod: QW,S$GLB,, | Performed by: NURSE PRACTITIONER

## 2021-04-05 ENCOUNTER — PATIENT MESSAGE (OUTPATIENT)
Dept: ADMINISTRATIVE | Facility: HOSPITAL | Age: 36
End: 2021-04-05

## 2021-04-26 ENCOUNTER — PATIENT MESSAGE (OUTPATIENT)
Dept: RESEARCH | Facility: HOSPITAL | Age: 36
End: 2021-04-26

## 2021-05-24 ENCOUNTER — OFFICE VISIT (OUTPATIENT)
Dept: URGENT CARE | Facility: CLINIC | Age: 36
End: 2021-05-24
Payer: COMMERCIAL

## 2021-05-24 VITALS
WEIGHT: 190 LBS | SYSTOLIC BLOOD PRESSURE: 129 MMHG | BODY MASS INDEX: 27.2 KG/M2 | TEMPERATURE: 99 F | RESPIRATION RATE: 16 BRPM | HEART RATE: 82 BPM | OXYGEN SATURATION: 96 % | HEIGHT: 70 IN | DIASTOLIC BLOOD PRESSURE: 87 MMHG

## 2021-05-24 DIAGNOSIS — J06.9 VIRAL URI WITH COUGH: Primary | ICD-10-CM

## 2021-05-24 DIAGNOSIS — M79.89 SOFT TISSUE MASS: ICD-10-CM

## 2021-05-24 LAB
CTP QC/QA: YES
SARS-COV-2 RDRP RESP QL NAA+PROBE: NEGATIVE

## 2021-05-24 PROCEDURE — U0002: ICD-10-PCS | Mod: QW,S$GLB,, | Performed by: PHYSICIAN ASSISTANT

## 2021-05-24 PROCEDURE — U0002 COVID-19 LAB TEST NON-CDC: HCPCS | Mod: QW,S$GLB,, | Performed by: PHYSICIAN ASSISTANT

## 2021-05-24 PROCEDURE — 99214 PR OFFICE/OUTPT VISIT, EST, LEVL IV, 30-39 MIN: ICD-10-PCS | Mod: CS,S$GLB,, | Performed by: PHYSICIAN ASSISTANT

## 2021-05-24 PROCEDURE — 3008F PR BODY MASS INDEX (BMI) DOCUMENTED: ICD-10-PCS | Mod: CPTII,S$GLB,, | Performed by: PHYSICIAN ASSISTANT

## 2021-05-24 PROCEDURE — 1125F AMNT PAIN NOTED PAIN PRSNT: CPT | Mod: S$GLB,,, | Performed by: PHYSICIAN ASSISTANT

## 2021-05-24 PROCEDURE — 99214 OFFICE O/P EST MOD 30 MIN: CPT | Mod: CS,S$GLB,, | Performed by: PHYSICIAN ASSISTANT

## 2021-05-24 PROCEDURE — 3008F BODY MASS INDEX DOCD: CPT | Mod: CPTII,S$GLB,, | Performed by: PHYSICIAN ASSISTANT

## 2021-05-24 PROCEDURE — 1125F PR PAIN SEVERITY QUANTIFIED, PAIN PRESENT: ICD-10-PCS | Mod: S$GLB,,, | Performed by: PHYSICIAN ASSISTANT

## 2021-05-24 RX ORDER — PREDNISONE 20 MG/1
TABLET ORAL
Qty: 6 TABLET | Refills: 0 | Status: SHIPPED | OUTPATIENT
Start: 2021-05-24 | End: 2021-05-28

## 2021-05-24 RX ORDER — PROMETHAZINE HYDROCHLORIDE AND DEXTROMETHORPHAN HYDROBROMIDE 6.25; 15 MG/5ML; MG/5ML
5 SYRUP ORAL NIGHTLY PRN
Qty: 118 ML | Refills: 0 | Status: SHIPPED | OUTPATIENT
Start: 2021-05-24 | End: 2021-06-03

## 2021-05-24 RX ORDER — BENZONATATE 200 MG/1
200 CAPSULE ORAL 3 TIMES DAILY PRN
Qty: 30 CAPSULE | Refills: 0 | Status: SHIPPED | OUTPATIENT
Start: 2021-05-24 | End: 2021-06-03

## 2021-10-05 ENCOUNTER — PATIENT MESSAGE (OUTPATIENT)
Dept: ADMINISTRATIVE | Facility: HOSPITAL | Age: 36
End: 2021-10-05

## 2022-11-23 NOTE — TELEPHONE ENCOUNTER
----- Message from Saul Farr sent at 2/20/2018  2:25 PM CST -----  Contact: patient  x_ 1st Request   _ 2nd Request   _ 3rd Request     Who: DAVEY TRIANA [[id number]]    Why: Pt missed your call is requesting a call back to discuss lab results.    What Number to Call Back: [phone]    When to Expect a call back: (Before the end of the day)   -- if call after 3:00 call back will be tomorrow.    
Pt informed of lab results and advice.  States verbal understanding. Patient has no further questions or concerns.    
Yes

## 2024-06-11 ENCOUNTER — OFFICE VISIT (OUTPATIENT)
Dept: OTOLARYNGOLOGY | Facility: CLINIC | Age: 39
End: 2024-06-11
Payer: COMMERCIAL

## 2024-06-11 VITALS
SYSTOLIC BLOOD PRESSURE: 142 MMHG | BODY MASS INDEX: 30.65 KG/M2 | WEIGHT: 213.63 LBS | DIASTOLIC BLOOD PRESSURE: 86 MMHG | HEART RATE: 86 BPM

## 2024-06-11 DIAGNOSIS — J34.3 NASAL TURBINATE HYPERTROPHY: ICD-10-CM

## 2024-06-11 DIAGNOSIS — J34.2 DEVIATED NASAL SEPTUM: ICD-10-CM

## 2024-06-11 DIAGNOSIS — G47.33 OBSTRUCTIVE SLEEP APNEA: Primary | ICD-10-CM

## 2024-06-11 DIAGNOSIS — M95.0 NASAL VALVE COLLAPSE: ICD-10-CM

## 2024-06-11 PROCEDURE — 1160F RVW MEDS BY RX/DR IN RCRD: CPT | Mod: CPTII,S$GLB,, | Performed by: OTOLARYNGOLOGY

## 2024-06-11 PROCEDURE — 1159F MED LIST DOCD IN RCRD: CPT | Mod: CPTII,S$GLB,, | Performed by: OTOLARYNGOLOGY

## 2024-06-11 PROCEDURE — 31231 NASAL ENDOSCOPY DX: CPT | Mod: S$GLB,,, | Performed by: OTOLARYNGOLOGY

## 2024-06-11 PROCEDURE — 99999 PR PBB SHADOW E&M-EST. PATIENT-LVL III: CPT | Mod: PBBFAC,,, | Performed by: OTOLARYNGOLOGY

## 2024-06-11 PROCEDURE — 3008F BODY MASS INDEX DOCD: CPT | Mod: CPTII,S$GLB,, | Performed by: OTOLARYNGOLOGY

## 2024-06-11 PROCEDURE — 3079F DIAST BP 80-89 MM HG: CPT | Mod: CPTII,S$GLB,, | Performed by: OTOLARYNGOLOGY

## 2024-06-11 PROCEDURE — 3077F SYST BP >= 140 MM HG: CPT | Mod: CPTII,S$GLB,, | Performed by: OTOLARYNGOLOGY

## 2024-06-11 PROCEDURE — 99204 OFFICE O/P NEW MOD 45 MIN: CPT | Mod: 25,S$GLB,, | Performed by: OTOLARYNGOLOGY

## 2024-06-11 NOTE — PROCEDURES
Nasal/sinus endoscopy    Date/Time: 6/11/2024 2:00 PM    Performed by: Clifford Hicks MD  Authorized by: Clifford Hicks MD    Anesthesia:     Local anesthetic:  Lidocaine 4% and William-Synephrine 1/2%    Patient tolerance:  Patient tolerated the procedure well with no immediate complications  Nose:     Procedure Performed:  Nasal Endoscopy  External:      No external nasal deformity  Intranasal:      Mucosa no polyps     Mucosa ulcers not present     No mucosa lesions present     Enlarged turbinates     Septum gross deformity  Nasopharynx:      No mucosa lesions     Adenoids not present     Posterior choanae patent     Eustachian tube not patent     Leftward septal deviation  No polyps

## 2024-06-11 NOTE — LETTER
2024    Tristan DixonJena, DDS  [address] [place] [address]         OTORHINOLARYNGOLOGY AND COMMUNICATION SCIENCES    System Chair  Sravan Meadows MD      Clifford Hicks MD, MPH    Chair, [facility]  Jania Serrato MD    Head & Neck Surgical Oncology  Steve Michaud MD, Section Head  MD Tiera Boucher MD Issam Eid, MD Emily Kamen, MD Brian Moore, MD Maggie Brignac, APRN, FNP-C    Facial Plastic and Reconstructive Surgery  MD JOHN PAUL Carreno III, MD    Otology and Neurotology  Reji Beavers, MD Rashaun Mayorga, MD Abhijit Vital, MD Angy Scott, [facility], FNP-C    Rhinology and Sinus Surgery  MD Clifford Boucher MD, MPH  Amilcar Gavin, PA-C    Otolaryngic Allergy  AMY Melgar, MD Jania Serrato MD    Laryngology  Luis Mendoza, MD    Sleep Surgery  AMY Melgar, MD Enoc Herrera, MD Esther Arechiga MD    Pediatric Otolaryngology  Andie Sher, MD Sravan Meadows, MD KAREN Bledsoe, MD NAVYA Christian, MD Shanna Goodwin, MD Oksana Christian, PA-C  Ginna Russo, APRN, PNP-C    Comprehensive Otolaryngology  Yenny Huston, MD Lauri Manning, MD Ana Carlson, MD Andre Yates, MD AMY Melgar, MD Gail Chapa, MD Rhea Hannah, MD Enoc Herrera, MD Esther Arechiga, MD Jania Serrato, MD Eleonora Nobles, MD Abhijit Whitmore, MD Tim Geronimo, MD Ivon Hernandes, MD Lorri Plasencia, PA-C  Sherrill Montano, APRN-CNP  Adia Ivan APRN, FNP-C  Ruth Ann Levin, FNP-C  Zelalem Hoover, PA-C  Diana Alvarez, APRN, FNP-C  Cher Delcid, PA-C  Lakeisha Jean, PA-C    Director, Audiology  NEDRA Manrique, CCC-A    Director, Speech-Language Pathology  Katya Quintanilla MA, CCC-SLP       Re:  Willy Branham  :  1985    Dear Dr. Dixon,    Thank you for referring your patient, Willy Branham, to us for evaluation and treatment.  I have enclosed a copy of my clinic note for your review and records.  If  you have any questions please do not hesitate to contact our office.     Thank you for allowing me to participate in the care of your patient.    Sincerely,        Clifford Hicks MD, MPH, FACS    Director, Rhinology and Sinus Surgery  Department of Otorhinolaryngology  [facility]    Encl:  Progress note     [facility] [address] [place] [address]  phone [phone]  fax [phone]  [email or web address]

## 2024-06-11 NOTE — PROGRESS NOTES
Subjective:      Willy Branham is a 38 y.o. male who was referred to me by Dr. Tristan Dixon in consultation for nasal obstruction.    He relates a long history for many years of snoring and associated nasal congestion.  He has been evaluated by Dr. Tristan Dixon and most recently had a custom oral appliance which he has used for the past few months.  He has been unable to tolerate CPAP due to nasal congestion and resistance.  He has some relief with nasal strips and magnetic nasal dilators, though use of these is limited by circumstance.  He denies facial pressure, postnasal drip, hyposmia or aural fullness.    Current sinonasal medications include flonase for over 8 weeks, as well as nasal decongestant sprays, xlear and saline.  The last course of antibiotics was a long time ago.  He does not regularly use nasal decongestant sprays.    He does not recall previously having allergy testing .    He denies a history of asthma.    He denies a history of reflux symptoms.    He relates a diagnosis of obstructive sleep apnea.     He has previously had sinonasal surgery consisting of septoplasty and uvulectomy by Dr. Issac Moseley about 6 years ago which gave transient relief.     He does not recall a prior history of nasal trauma other than the sinonasal surgery.    SNOT-22 score: : (P) 34  NOSE score:: (P) 80%  ETDQ-7 score:: (P) 2.4      Past Medical History  He has no past medical history on file.    Past Surgical History  He has a past surgical history that includes Appendectomy; Nasal septoplasty; and Uvulopalatopharyngoplasty.    Family History  His family history includes Cancer in his father and paternal grandmother; Hypertension in his father; No Known Problems in his mother; Stroke in his maternal grandmother.    Social History  He reports that he has never smoked. He has never used smokeless tobacco. He reports current alcohol use of about 7.0 standard drinks of alcohol per week. He reports that he does not  use drugs.    Allergies  He has No Known Allergies.    Medications   He currently has no medications in their medication list.    Review of Systems     Constitutional: Positive for fatigue.  Negative for appetite change, chills, fever and unexpected weight loss.      HENT: Positive for postnasal drip, sinus infection and sinus pressure.  Negative for ear discharge, ear infection, ear pain, facial swelling, hearing loss, mouth sores, nosebleeds, ringing in the ears, runny nose, sore throat, stuffy nose, tonsil infection, dental problems, trouble swallowing and voice change.      Eyes:  Negative for change in eyesight, eye drainage, eye itching and photophobia.     Respiratory:  Positive for sleep apnea and snoring. Negative for cough, shortness of breath and wheezing.      Cardiovascular:  Negative for chest pain, foot swelling, irregular heartbeat and swollen veins.     Gastrointestinal:  Negative for abdominal pain, acid reflux, constipation, diarrhea, heartburn and vomiting.     Genitourinary: Negative for difficulty urinating, sexual problems and frequent urination.     Musc: Negative for aching joints, aching muscles, back pain and neck pain.     Skin: Negative for rash.     Allergy: Negative for food allergies and seasonal allergies.     Endocrine: Negative for cold intolerance and heat intolerance.      Neurological: Positive for headaches. Negative for dizziness, light-headedness, seizures and tremors.      Hematologic: Negative for bruises/bleeds easily and swollen glands.      Psychiatric: Negative for decreased concentration, depression, nervous/anxious and sleep disturbance.               Objective:     BP (!) 142/86 (BP Location: Right arm, Patient Position: Sitting, BP Method: Large (Automatic))   Pulse 86   Wt 96.9 kg (213 lb 10 oz)   BMI 30.65 kg/m²        Constitutional:   He appears well-developed. He is cooperative. Normal speech.  No hoarse voice.      Head:  Normocephalic. Salivary glands  normal.  Facial strength is normal.      Ears:    Right Ear: No drainage or tenderness. Tympanic membrane is not perforated. Tympanic membrane mobility is normal. No middle ear effusion. No decreased hearing is noted.   Left Ear: No drainage or tenderness. Tympanic membrane is not perforated. Tympanic membrane mobility is normal.  No middle ear effusion. No decreased hearing is noted.     Nose:  Septal deviation present. No mucosal edema, rhinorrhea or polyps. No epistaxis. Turbinate hypertrophy.  Turbinates normal and no turbinate masses.  Right sinus exhibits no maxillary sinus tenderness and no frontal sinus tenderness. Left sinus exhibits no maxillary sinus tenderness and no frontal sinus tenderness.   Gross nasal valve collapse on inspiration  Positive modified Sixto maneuver bilaterally  Leftward anterior septal C-shape deformity    Mouth/Throat  Oropharynx clear and moist without lesions or asymmetry and normal uvula midline. He does not have dentures. Normal dentition. No oral lesions or mucous membrane lesions. No oropharyngeal exudate or posterior oropharyngeal erythema. Tonsils present, +1.  Mirror exam not performed due to patient tolerance.  Mirror exam not performed due to patient tolerance.      Neck:  Neck normal without thyromegaly masses, asymmetry, normal tracheal structure, crepitus, and tenderness, thyroid normal, trachea normal and no adenopathy. Normal range of motion present.     He has no cervical adenopathy.     Cardiovascular:    Regular rhythm.              Pulmonary/Chest:   Effort normal.     Psychiatric:   He has a normal mood and affect. His speech is normal and behavior is normal.     Neurological:   No cranial nerve deficit.     Skin:   No rash noted.       Procedure    Nasal endoscopy performed.  See procedure note.        Data Reviewed    WBC (K/uL)   Date Value   04/27/2015 6.27     Eosinophil % (%)   Date Value   04/27/2015 4.8     Eos # (K/uL)   Date Value   04/27/2015 0.3  "    Platelets (K/uL)   Date Value   04/27/2015 242     Glucose (mg/dL)   Date Value   02/16/2018 85     No results found for: "IGE"    No sinus imaging available.       Assessment:     1. Obstructive sleep apnea    2. Nasal turbinate hypertrophy    3. Nasal valve collapse    4. Deviated nasal septum         Plan:     I had a long discussion with the patient regarding his condition and the further workup and management options.  He would benefit from turbinate reduction with medial flap turbinoplasty, and with nasal valve implant for the treatment of his condition.  He may also require revision septoplasty depending on surgical access.  We discussed also the option of a Vivaer procedure though in my experience this would not give as durable results given the setting of prior surgical intervention and marked nasal valve compromise.  I discussed the risks, benefits and alternatives to surgery with the patient, as well as the expected postoperative course.  I gave him the opportunity to ask questions and I answered all of them.  I provided relevant printed information on his condition for him to review at home.  Same-day discharge is anticipated.  He may have anesthesia triage by telephone.   The surgery will be scheduled in the near future.    Follow up for surgery.      "

## 2024-06-14 ENCOUNTER — TELEPHONE (OUTPATIENT)
Dept: OTOLARYNGOLOGY | Facility: CLINIC | Age: 39
End: 2024-06-14
Payer: COMMERCIAL

## 2024-06-14 NOTE — TELEPHONE ENCOUNTER
Patient is currently schedule on 07/05 for BITR/Latera. Patient exploring his options on nasal valve collapse/turbinate surgery. He would like a recommendation on who would Dr. Hicks recommend he see about removing the cartilage from his ear an placing in his nose vs implant.

## 2024-06-14 NOTE — TELEPHONE ENCOUNTER
----- Message from Ella Gaytan sent at 6/14/2024 11:06 AM CDT -----  Regarding: advise before surgery 7/5/24  Contact: [phone]  Willy Branham calling regarding Patient Advice (message) exploring his options on his nasal valve collapse/turbinate surgery.  He would like a recommendation on who would Dr. Hicks recommend he see about removing the cartilage from his ear an placing in his nasal vs implant.  Would the 2 surgery be done together.  Please call Pt for more info

## 2024-06-26 ENCOUNTER — OFFICE VISIT (OUTPATIENT)
Dept: OTOLARYNGOLOGY | Facility: CLINIC | Age: 39
End: 2024-06-26
Payer: COMMERCIAL

## 2024-06-26 VITALS
BODY MASS INDEX: 30.56 KG/M2 | HEART RATE: 77 BPM | SYSTOLIC BLOOD PRESSURE: 122 MMHG | WEIGHT: 212.94 LBS | DIASTOLIC BLOOD PRESSURE: 81 MMHG

## 2024-06-26 DIAGNOSIS — J34.2 NASAL SEPTAL DEVIATION: ICD-10-CM

## 2024-06-26 DIAGNOSIS — J34.2 DEVIATED NASAL SEPTUM: ICD-10-CM

## 2024-06-26 DIAGNOSIS — J34.3 NASAL TURBINATE HYPERTROPHY: ICD-10-CM

## 2024-06-26 DIAGNOSIS — J34.89 NASAL OBSTRUCTION: ICD-10-CM

## 2024-06-26 DIAGNOSIS — M95.0 NASAL VALVE COLLAPSE: Primary | ICD-10-CM

## 2024-06-26 DIAGNOSIS — Z01.818 PRE-OP TESTING: ICD-10-CM

## 2024-06-26 DIAGNOSIS — J34.3 NASAL TURBINATE HYPERTROPHY: Primary | ICD-10-CM

## 2024-06-26 DIAGNOSIS — M95.0 NASAL VALVE COLLAPSE: ICD-10-CM

## 2024-06-26 DIAGNOSIS — G47.33 OBSTRUCTIVE SLEEP APNEA: ICD-10-CM

## 2024-06-26 PROCEDURE — 1160F RVW MEDS BY RX/DR IN RCRD: CPT | Mod: CPTII,S$GLB,, | Performed by: OTOLARYNGOLOGY

## 2024-06-26 PROCEDURE — 3008F BODY MASS INDEX DOCD: CPT | Mod: CPTII,S$GLB,, | Performed by: OTOLARYNGOLOGY

## 2024-06-26 PROCEDURE — 3074F SYST BP LT 130 MM HG: CPT | Mod: CPTII,S$GLB,, | Performed by: OTOLARYNGOLOGY

## 2024-06-26 PROCEDURE — 1159F MED LIST DOCD IN RCRD: CPT | Mod: CPTII,S$GLB,, | Performed by: OTOLARYNGOLOGY

## 2024-06-26 PROCEDURE — 99214 OFFICE O/P EST MOD 30 MIN: CPT | Mod: S$GLB,,, | Performed by: OTOLARYNGOLOGY

## 2024-06-26 PROCEDURE — 3079F DIAST BP 80-89 MM HG: CPT | Mod: CPTII,S$GLB,, | Performed by: OTOLARYNGOLOGY

## 2024-06-26 PROCEDURE — 99999 PR PBB SHADOW E&M-EST. PATIENT-LVL III: CPT | Mod: PBBFAC,,, | Performed by: OTOLARYNGOLOGY

## 2024-06-26 RX ORDER — ONDANSETRON 4 MG/1
8 TABLET, FILM COATED ORAL 2 TIMES DAILY PRN
COMMUNITY
Start: 2024-03-20

## 2024-06-26 RX ORDER — UBROGEPANT 100 MG/1
TABLET ORAL
COMMUNITY
Start: 2024-03-26

## 2024-06-26 NOTE — PROGRESS NOTES
Mr. Branham     Vitals:    06/26/24 0900   BP: 122/81   Pulse: 77       Chief Complaint:  Nasal obstruction     HPI:  Mr. Branham is a 38-year-old white male who presents referred by  and Reji for nasal airway obstruction.  Significant past history is positive for septoplasty approximately 8 years ago by .  He was recently evaluated by  with the following findings; He has been evaluated by Dr. Tristan Dixon and most recently had a custom oral appliance which he has used for the past few months.  He has been unable to tolerate CPAP due to nasal congestion and resistance.  He has some relief with nasal strips and magnetic nasal dilators, though use of these is limited by circumstance.  He denies facial pressure, postnasal drip, hyposmia or aural fullness.   Current sinonasal medications include flonase for over 8 weeks, as well as nasal decongestant sprays, xlear and saline.   He was also found to have persistent nasal septal deviation and hypertrophic turbinates as well as valve collapse.  He does have a diagnosis of DEWAYNE and has tried multiple remedies for this with limited success.    SNOT22- 31 NOSE- 85    Review of Systems:  Constitutional:   weight loss or weight gain: Negative  Allergy/Immunologic:   Negative  Nasal Congestion/Obstruction:   Positive as above  Nosebleeds:   Negative  Sinus infections:   Negative  Headache/Facial Pain:   Negative  Snoring/DEWAYNE:   Positive for DEWAYNE as above  Throat: Infections/Pain:   Negative  Hoarseness/Speech Disturbance:   Negative  Trauma Hx:  Negative    Cardiovascular:  M/I Angina: Negative  Hypertension: Negative  Endocrine:    DM/Steroids: Negative  GI:   Dysphagia/Reflux: Negative  :   GYN Pregnancy: Negative  Renal:   Dialysis: Negative  Lymphatic:   Neck Mass/Lymphadenopathy: Negative  Muscoloskeletal:   Negative  Hematologic:   Bleeding Disorders/Anemia: Negative  Neurologic:    Cranial/Neuralgia: Negative  Pulmonary:   Asthma/SOB/Cough:  Negative  Skin Disorders: Negative    Past Medical/Surgical/Family/Social History:    ENT Surgery:  Status post previous septoplasty as well as UPPP  Occupational Exposure: Negative   Problems: Negative  Cancer: Negative    Past Family History:   Family history of Cancer: Negative    Past Social History:   Tobacco: Nonsmoker   Alcohol:  3-5 times weekly Social Drinker      Allergies and medications: Reviewed per med card.    Physical Examination:  Ears:   External auditory canals:  Clear   Hearing: Grossly intact   Tympanic Membranes: Clear  Nose:   External:  Bilateral external valve collapse with moderate inspiration, positive [place] maneuver.   Intranasal:  Septal deviation to the left with 2+ turbinates.  All of these deformities together creating 90% obstruction.  Mouth:   Intraorally: Lips, teeth, and gums: Normal   Oropharynx: Normal   Mucosa: Normal   Tongue: Normal  Throat:      Palate:  Status post UPPP   Tonsils:  Absent   Posterior Pharynx: Normal  Fiberoptic exam: Not performed  Head/Face:     Inspection: Normal and atraumatic   Palpation/Percussion: Non tender   Facial strength: Normal and symmetric   Salivary glands: Normal  Neck: Supple  Thyroid: No masses  Lymphatics: No nodes  Respiratory:   Effort: Normal  Eyes:   Ocular Mobility: Normal   Vision: Grossly intact  Neuro/Psych:   Cranial Nerves: Grossly Intact   Orientation: Normal   Mood/Affect: Normal      Assessment/Plan:  I have discussed my findings with him in detail as well as my recommendations for treatment.  We have discussed that he could benefit from nasal reconstruction with bilateral auricular cartilage Batten grafts, revision septoplasty, submucous resection of turbinates.  Utilizing Q-tips I have demonstrated how the Batten grafts would change the shape of his nose and support his external valves.  I have discussed the pros and cons risks and benefits as well as technical aspects of this procedure in detail with him.  He  understands and accepts these and wishes to proceed with scheduling.  I will send him for medical photographs as well.

## 2024-08-09 ENCOUNTER — TELEPHONE (OUTPATIENT)
Dept: OTOLARYNGOLOGY | Facility: CLINIC | Age: 39
End: 2024-08-09
Payer: COMMERCIAL

## 2024-08-22 ENCOUNTER — TELEPHONE (OUTPATIENT)
Dept: OTOLARYNGOLOGY | Facility: CLINIC | Age: 39
End: 2024-08-22
Payer: COMMERCIAL

## 2024-08-22 NOTE — TELEPHONE ENCOUNTER
----- Message from Florencia Banda MA sent at 8/21/2024  4:12 PM CDT -----  Regarding: FW: return call    ----- Message -----  From: Cristóbal Thompson MA  Sent: 8/21/2024   2:47 PM CDT  To: Florencia Banad MA  Subject: FW: return call                                    ----- Message -----  From: Alexia Lozano  Sent: 8/21/2024   2:43 PM CDT  To: Rhett MOREJON III Staff  Subject: return call                                      PATIENT RETURNING CALL    Pt returned Florencia's call regarding sx rescheduling. Please call pt at [phone]

## 2024-08-27 ENCOUNTER — TELEPHONE (OUTPATIENT)
Dept: SPORTS MEDICINE | Facility: CLINIC | Age: 39
End: 2024-08-27
Payer: COMMERCIAL

## 2024-08-27 NOTE — TELEPHONE ENCOUNTER
Contacted patient to obtain laterality of affected shoulder. Patient states it is his left shoulder and I expressed understanding.     Qing Luo MS, OTC  Clinical Assistant to Dr. Paulie Morrison

## 2024-09-03 ENCOUNTER — HOSPITAL ENCOUNTER (OUTPATIENT)
Dept: RADIOLOGY | Facility: HOSPITAL | Age: 39
Discharge: HOME | End: 2024-09-03
Attending: ORTHOPAEDIC SURGERY
Payer: COMMERCIAL

## 2024-09-03 ENCOUNTER — OFFICE VISIT (OUTPATIENT)
Dept: SPORTS MEDICINE | Facility: CLINIC | Age: 39
End: 2024-09-03
Payer: COMMERCIAL

## 2024-09-03 VITALS
BODY MASS INDEX: 30.49 KG/M2 | DIASTOLIC BLOOD PRESSURE: 87 MMHG | WEIGHT: 212.94 LBS | HEIGHT: 70 IN | HEART RATE: 63 BPM | SYSTOLIC BLOOD PRESSURE: 129 MMHG

## 2024-09-03 DIAGNOSIS — M79.10 MYALGIA: ICD-10-CM

## 2024-09-03 DIAGNOSIS — M54.59 MECHANICAL LOW BACK PAIN: ICD-10-CM

## 2024-09-03 DIAGNOSIS — M99.06 SOMATIC DYSFUNCTION OF LOWER EXTREMITY: ICD-10-CM

## 2024-09-03 DIAGNOSIS — M25.511 RIGHT SHOULDER PAIN, UNSPECIFIED CHRONICITY: ICD-10-CM

## 2024-09-03 DIAGNOSIS — M99.08 SOMATIC DYSFUNCTION OF RIB CAGE REGION: ICD-10-CM

## 2024-09-03 DIAGNOSIS — M99.03 SOMATIC DYSFUNCTION OF LUMBAR REGION: ICD-10-CM

## 2024-09-03 DIAGNOSIS — M25.561 CHRONIC PAIN OF RIGHT KNEE: Primary | ICD-10-CM

## 2024-09-03 DIAGNOSIS — M25.561 RIGHT KNEE PAIN: ICD-10-CM

## 2024-09-03 DIAGNOSIS — M54.9 UPPER BACK PAIN: ICD-10-CM

## 2024-09-03 DIAGNOSIS — G89.29 CHRONIC PAIN OF RIGHT KNEE: Primary | ICD-10-CM

## 2024-09-03 DIAGNOSIS — M25.512 CHRONIC LEFT SHOULDER PAIN: ICD-10-CM

## 2024-09-03 DIAGNOSIS — M99.01 SOMATIC DYSFUNCTION OF CERVICAL REGION: ICD-10-CM

## 2024-09-03 DIAGNOSIS — M99.02 SOMATIC DYSFUNCTION OF THORACIC REGION: ICD-10-CM

## 2024-09-03 DIAGNOSIS — M99.05 SOMATIC DYSFUNCTION OF PELVIS REGION: ICD-10-CM

## 2024-09-03 DIAGNOSIS — M99.00 CRANIAL SOMATIC DYSFUNCTION: ICD-10-CM

## 2024-09-03 DIAGNOSIS — M99.07 SOMATIC DYSFUNCTION OF UPPER EXTREMITY: ICD-10-CM

## 2024-09-03 DIAGNOSIS — M99.04 SOMATIC DYSFUNCTION OF SACRAL REGION: ICD-10-CM

## 2024-09-03 DIAGNOSIS — G89.29 CHRONIC LEFT SHOULDER PAIN: ICD-10-CM

## 2024-09-03 PROCEDURE — 97110 THERAPEUTIC EXERCISES: CPT | Mod: S$GLB,,, | Performed by: ORTHOPAEDIC SURGERY

## 2024-09-03 PROCEDURE — 3008F BODY MASS INDEX DOCD: CPT | Mod: CPTII,S$GLB,, | Performed by: ORTHOPAEDIC SURGERY

## 2024-09-03 PROCEDURE — 1159F MED LIST DOCD IN RCRD: CPT | Mod: CPTII,S$GLB,, | Performed by: ORTHOPAEDIC SURGERY

## 2024-09-03 PROCEDURE — 3079F DIAST BP 80-89 MM HG: CPT | Mod: CPTII,S$GLB,, | Performed by: ORTHOPAEDIC SURGERY

## 2024-09-03 PROCEDURE — 73030 X-RAY EXAM OF SHOULDER: CPT | Mod: 26,LT,, | Performed by: RADIOLOGY

## 2024-09-03 PROCEDURE — 3074F SYST BP LT 130 MM HG: CPT | Mod: CPTII,S$GLB,, | Performed by: ORTHOPAEDIC SURGERY

## 2024-09-03 PROCEDURE — 1160F RVW MEDS BY RX/DR IN RCRD: CPT | Mod: CPTII,S$GLB,, | Performed by: ORTHOPAEDIC SURGERY

## 2024-09-03 PROCEDURE — 98929 OSTEOPATH MANJ 9-10 REGIONS: CPT | Mod: S$GLB,,, | Performed by: ORTHOPAEDIC SURGERY

## 2024-09-03 PROCEDURE — 99204 OFFICE O/P NEW MOD 45 MIN: CPT | Mod: 25,S$GLB,, | Performed by: ORTHOPAEDIC SURGERY

## 2024-09-03 PROCEDURE — 73564 X-RAY EXAM KNEE 4 OR MORE: CPT | Mod: TC,50

## 2024-09-03 PROCEDURE — 99999 PR PBB SHADOW E&M-EST. PATIENT-LVL III: CPT | Mod: PBBFAC,,, | Performed by: ORTHOPAEDIC SURGERY

## 2024-09-03 PROCEDURE — 73030 X-RAY EXAM OF SHOULDER: CPT | Mod: TC,LT

## 2024-09-03 PROCEDURE — 73564 X-RAY EXAM KNEE 4 OR MORE: CPT | Mod: 26,,, | Performed by: RADIOLOGY

## 2024-09-03 NOTE — PROGRESS NOTES
CC: right knee, upper back, and low back pain     39 y.o. Male presents today for evaluation of his chronic right knee, upper back pain, and low back pain. In regards to the right knee pain, he states that it has been worsening over the past two years as he returns to running more frequently at a higher mileage. The pain is diffuse over the right knee without specific points of tenderness. He has done physical therapy specifically targeted for runners in the past which helped. He states he had imaging, including MRI, of the right knee without evidence of right knee pathology. The right knee pain radiates into the calf musculature when it occurs. Knee pain worsens with increased running and twisting of the knee while standing. At rest, he denies pain. He denies feelings of instability or mechanical symptoms.     In regards to the upper and lower back pain, he has had this pain for 20+ years. The upper back pain is located in the upper back musculature and feels that it may be worsening migraines/cervicogenic headaches. Right and left side hurt about the same. He denies shoulder pain. Low back pain in the past radiated down the right leg but does not currently. The pain stays in the lumbar spine with left side worse than right. He states previous imaging has not alluded to lumbar or cervical spine pathology. Low back pain worsens with certain movements causing strain on low back. He does not have pain at rest today. He has seen a chiropractor in the past with some improvement. He has also had two epidurals for the lumbar spine which did provide relief for the right radicular symptoms. Pain can make it difficult to play with his young children, etc. He does work constantly on computer which does seem to worsen neck/back strain. 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 He was a swimmer in the past.       PAST MEDICAL HISTORY:   History reviewed. No  pertinent past medical history.    PAST SURGICAL HISTORY:   Past Surgical History:   Procedure Laterality Date    APPENDECTOMY      NASAL SEPTOPLASTY      UVULOPALATOPHARYNGOPLASTY         FAMILY HISTORY:   Family History   Problem Relation Name Age of Onset    No Known Problems Mother      Cancer Father          renal cell ca    Hypertension Father      Stroke Maternal Grandmother      Cancer Paternal Grandmother          breast ca       SOCIAL HISTORY:   Social History     Socioeconomic History    Marital status:    Tobacco Use    Smoking status: Never    Smokeless tobacco: Never   Substance and Sexual Activity    Alcohol use: Yes     Alcohol/week: 7.0 standard drinks of alcohol     Types: 4 Glasses of wine, 3 Cans of beer per week    Drug use: No    Sexual activity: Yes     Partners: Female   Social History Narrative    Pt works in [employer] lives with wife and has 17 month old . No pets.      Social Determinants of Health     Financial Resource Strain: Low Risk  (7/8/2024)    Overall Financial Resource Strain (CARDIA)     Difficulty of Paying Living Expenses: Not hard at all   Food Insecurity: No Food Insecurity (7/8/2024)    Hunger Vital Sign     Worried About Running Out of Food in the Last Year: Never true     Ran Out of Food in the Last Year: Never true   Transportation Needs: No Transportation Needs (9/17/2023)    Received from [facility]    [facility] - Transportation     Lack of Transportation (Medical): No     Lack of Transportation (Non-Medical): No   Physical Activity: Sufficiently Active (7/8/2024)    Exercise Vital Sign     Days of Exercise per Week: 4 days     Minutes of Exercise per Session: 40 min   Stress: No Stress Concern Present (7/8/2024)    [employer] - Occupational Stress Questionnaire     Feeling of Stress : Not at all   Housing Stability: Unknown (7/8/2024)    Housing Stability Vital Sign     Unable to Pay for Housing in the Last Year: No  "      MEDICATIONS:     Current Outpatient Medications:     UBRELVY 100 mg tablet, TAKE 1 TABLET BY MOUTH AS NEEDED (MIGRAINE), Disp: , Rfl:     ondansetron (ZOFRAN) 4 MG tablet, Take 8 mg by mouth 2 (two) times daily as needed. (Patient not taking: Reported on 9/3/2024), Disp: , Rfl:     ALLERGIES:   Review of patient's allergies indicates:  No Known Allergies     PHYSICAL EXAMINATION:  /87   Pulse 63   Ht 5' 10" (1.778 m)   Wt 96.6 kg (212 lb 15.4 oz)   BMI 30.56 kg/m²   Vitals signs and nursing note have been reviewed.  General: In no acute distress, well developed, well nourished, no diaphoresis  Eyes: EOM full and smooth, no eye redness or discharge  HENT: normocephalic and atraumatic, neck supple, trachea midline, no nasal discharge, no external ear redness or discharge  Cardiovascular: 2+ and symmetric radial bilaterally, no LE edema  Lungs: respirations non-labored, no conversational dyspnea   Abd: non-distended, no rigidity  MSK: no amputation or deformity, no swelling of extremities  Neuro: AAOx3, CN2-12 grossly intact  Skin: No rashes, warm and dry  Psychiatric: cooperative, pleasant, mood and affect appropriate for age    SHOULDER: LEFT  The affected shoulder is compared to the contralateral shoulder.    Observation:    CERVICAL SPINE  Normal head carriage. + mild thoracic kyphosis.  Slight decrease in ROM of lateral rotation; otherwise, Full AROM in flexion, extension, sidebending.    SHOULDER  No ecchymosis, edema, or erythema throughout the shoulder girdle.  No sternal, clavicular, or acromial deformities bilaterally.  No atrophy of the pectorals, deltoids, supraspinatus, infraspinatus, or biceps bilaterally.  No asymmetry of shoulders bilaterally.    ROM:  Active flexion to 180° on left and 180° on right.   Active abduction to 180° on left and 180° on right.    No scapular dyskinesia or winging.    Tenderness:  No tenderness at the SC or AC joint  No tenderness over the clavicle   No " tenderness over biceps tendon in the bicipital groove  No tenderness over subacromial space  + tenderness over the shoulder girdle bilaterally, more so on the left    Strength Testing: (*pain)  Deltoid - 5/5 on left and 5/5 on right  Biceps - 5/5 on left and 5/5 on right  Triceps - 5/5 on left and 5/5 on right  Wrist extension - 5/5 on left and 5/5 on right  Wrist flexion - 5/5 on left and 5/5 on right   - 5/5 on left and 5/5 on right  Finger extension - 5/5 on left and 5/5 on right  Finger abduction - 5/5 on left and 5/5 on right    Neurovascular Exam:  2+ radial pulses BL  Sensation intact to light touch in the distal median, radial, and ulnar nerve distributions bilaterally.  Spurlings test - negative  Capillary refill intact <2 seconds in all digits bilaterally    MUSCULOSKELETAL EXAM:    RIGHT KNEE EXAMINATION   Affected side is compared to contralateral knee     Observation:  No edema, erythema, ecchymosis, or effusion noted.  No muscle atrophy of the thighs and calves noted.  No obvious bony deformities noted.   No patella romulo or baja noted.  No genu valgus/varum noted. No recurvatum noted.    No tibial internal/external torsion.      Tenderness:  Patella - none    Lateral joint line - none  Quad tendon - none   Medial joint line - none  Patellar tendon - none  Medial plica - none  Tibial tubercle - none   Lateral plica - none  Pes anserine - none   MCL prox - none  Distal ITB - none   MCL distal - none  MFC - none    LCL prox - none  LFC - none    LCL distal - none  Tibia - none    Fibula - none    No obvious bursae, plicae, popliteal cysts, or tendon derangement palpated.          ROM:   Active extension to 0° bilaterally without hyperextension, lag, crepitus, or patellar J sign.  Active flexion to 135° bilaterally.    Strength: (bilaterally)  Knee Flexion - 5/5  Knee Extension - 5/5  Hip Flexion - 5/5  Hip Extension - 5/5  Ankle dorsiflexion - 5/5  Ankle Plantarflexion - 5/5    Patellofemoral  Exam:  Patella position - Neutral   Patellar ballottement - negative  Bulge sign - negative  Patellar grind - positive right  No patellar laxity with medial and lateral translation   No apprehension with medial and lateral patellar translation.     Meniscus Testing:     No pain with terminal extension and flexion.  Salinass test - negative   Thesaly test - negative    Ligament Testing:  Lachman's test - negative  No laxity with anterior drawer.  No laxity with posterior drawer.    No posterior sag sign.   No laxity with varus testing at 0 and 30 degrees.  No laxity with valgus testing at 0 and 30 degrees.    Neurovascular Examination:   Normal gait without antalgia.  Sensation intact to light touch in the obturator, lateral/intermediate/medial/posterior femoral cutaneous, saphenous, and common peroneal nerves bilaterally.  Motor Function:    Fully intact motor function at hip, knee, foot and ankle.  DTRs: 2+/4 reflexes at L4 and S1 dermatomes. No clonus.   Pulses intact at the DP and PT arteries bilaterally.    Capillary refill intact <2 seconds in all toes bilaterally.    Lumbar Spine: bilateral lumbar region    Observation:    Normal cervicothoracolumbar curves.    No obvious pelvic obliquity while standing.    No edema, erythema, or ecchymosis noted in lumbosacral region.    No midline skin abnormalities.    No atrophy of lower limb musculature.  Leg length discrepancy with right longer than left through landmark visualization    Anterior rotation of right hip with elevated PSIS     Tenderness:  + tenderness over the right lumbar spine, iliolumbar region, posterior pelvis.  No tenderness over the sacrum, piriformis, greater/lesser trochanters.  No bony deformities or step-offs palpated.     Range of Motion:  Active flexion to 60°.   Active extension to 25°.   Active rotation to 30° bilaterally.    Active sidebending to 25° bilaterally.  Passive hip flexion to 135° bilaterally.    Passive hip internal rotation  to 45° bilaterally.    Passive hip external rotation to 45° bilaterally.    All ROM testing is without pain.    Strength Testing:  Hip flexion - 5/5  Hip extension - 5/5  Knee flexion - 5/5  Knee extension - 5/5  Dorsiflexion - 5/5  Plantarflexion - 5/5  Great toe extension - 5/5    Special Tests:  Seated straight leg raise - negative  NAVEEN test - negative  FADIR test - negative      Posture:  Upright and Anterior pelvic tilt with increased lumbar lordosis  Gait: Non-antalgic     TART (Tissue texture abnormality, Asymmetry,  Restriction of motion and/or Tenderness) changes:    Head: Occipitoatlantal (OA) Joint ES-left, R-right     Cervical Spine  Thoracic Spine  Lumbar Spine   C1 Neutral T1 Neutral L1 Neutral   C2 FRSR T2 ERSR L2 Neutral   C3 ERSR T3 ERSR L3 Neutral   C4 Neutral T4 Neutral L4 ERSR   C5 Neutral T5 Neutral L5 FRSR   C6 FRSL T6 Neutral     C7 Neutral T7 Neutral       T8 ERSL       T9 Neutral       T10 Neutral       T11 Neutral       T12 Neutral       Ribs:  Exhalation restriction of the bilateral upper ribcage  Superior rib 1 on the left  Posterior rib 6-7 on the right    Upper Extremity:  Periscapular MFR on the right  HTP left medial trapezius muscle    Abdomen:    Pelvis:  Innominate:Right anterior rotation  Pubic bone:Neutral    Sacrum:Left on Right sacral torsion    Lower Extremity:  Internal tibial torsion on the right  MFR right leg      Key   F= Flexed   E = Extended   R = Rotated   N = Neutral   S = Sidebent   TTA = tissue texture abnormality   L/R/B (last letter) = left/right/bilateral   HTP = fascial herniated trigger point   TB = fascial trigger band   CD = fascial continuum distortion   MFR = myofascial restriction       Neurovascular Exam:  Sensation intact to light touch in the L2-S2 dermatomes bilaterally.   No pretibial edema or abnormal hair pattern of the shin.    Normal gait without trendelenberg.      IMAGIN. X-ray ordered due to left shoulder pain   2. X-ray images were  reviewed personally by me and then directly with patient.  3. FINDINGS: X-ray images obtained demonstrate no evidence of recent or healing fracture, lytic destructive process, or appreciable glenohumeral arthritic change observed. No glenohumeral dislocation. No abnormal soft tissue calcifications.   4. IMPRESSION: As above.     1. X-ray ordered due to right knee pain   2. X-ray images were reviewed personally by me and then directly with patient.  3. FINDINGS: X-ray images obtained demonstrate no significant degree of tibiofemoral or patellofemoral joint space narrowing is observed. Osseous structures demonstrate no evidence of recent or healing fracture, lytic destructive process, or osteochondral defect. No significant joint effusion is observed on either side.   4. IMPRESSION: As above.       ASSESSMENT:      ICD-10-CM ICD-9-CM   1. Chronic pain of right knee  M25.561 719.46    G89.29 338.29   2. Chronic left shoulder pain  M25.512 719.41    G89.29 338.29   3. Mechanical low back pain  M54.59 724.2   4. Upper back pain  M54.9 724.5   5. Myalgia  M79.10 729.1   6. Somatic dysfunction of lumbar region  M99.03 739.3   7. Somatic dysfunction of pelvis region  M99.05 739.5   8. Somatic dysfunction of upper extremity  M99.07 739.7   9. Somatic dysfunction of lower extremity  M99.06 739.6   10. Somatic dysfunction of cervical region  M99.01 739.1   11. Cranial somatic dysfunction  M99.00 739.0   12. Somatic dysfunction of sacral region  M99.04 739.4   13. Somatic dysfunction of thoracic region  M99.02 739.2   14. Somatic dysfunction of rib cage region  M99.08 739.8         PLAN:  Chronic right knee pain  Chronic left shoulder pain  3-5. Mechanical LBP/upper back pain/myalgia -     - Willy admits to chronic right knee pain and chronic lower and upper back pain.     - XRs ordered in the office today and images were personally reviewed with the patient. See above for further detail.    - Symptoms, exam, and imaging are  most consistent with mechanical lower and upper back pain and chronic right knee pain due to overcompensation, poor neuromuscular firing and an acquired leg length discrepancy.  We discussed the importance of decreasing inflammation and strengthening and stabilizing to help promote and maintain symptom improvement/resolution.  This is commonly accomplished with a short course of an anti-inflammatory and icing in addition to osteopathic manipulation, a home exercise program or physical therapy.     - Based on his description of pain/body language and somatic dysfunction identified on exam, I discussed osteopathic manipulation as a treatment option today. He consents to evaluation and treatment. See below.    - HEP for abdominal bracing, ant marches, diaphragmatic breathing, pelvic clocks 6-12, shoulder circles, snow angels, cat/cow, Albanian exercises prescribed today. Handouts provided, explained, and exercises were demonstrated as needed. Encouraged to do daily. [address] HOME EXERCISE PROGRAM (HEP):  The patient was taught a homegoing physical therapy regimen as described above by provider with assistance of sports medicine assistant. The patient demonstrated understanding of the exercises and proper technique of their execution. This interaction took 15 minutes.       6-14. Somatic dysfunction of lumbar, pelvis, sacral, thoracic, lower extremity, upper extremity, ribcage, cervical, cranial regions -     - OMT 9-10 regions. Oral consent obtained. Reviewed benefits and potential side effects. OMT indicated today due to signs and symptoms as well as local and remote somatic dysfunction findings and their related neurokinetic, lymphatic, fascial and/or arteriovenous body connections. OMT techniques used: Soft Tissue, Myofascial Release, Muscle Energy, High Velocity Low Amplitude, Fascial Distortion Model, and Articulatory. Treatment was tolerated well. Improvement noted in segmental mobility post-treatment in  dysfunctional regions. There were no adverse events and no complications immediately following treatment. Advised plenty of water to help alleviate soreness.      Future planning includes - possibly more OMT if helpful and if indicated    All questions were answered to the best of my ability and all concerns were addressed at this time.    Follow up in 3-4 weeks for above, or sooner if needed.    This note is dictated using the M*Modal Fluency Direct word recognition program. There are word recognition mistakes that are occasionally missed on review.

## 2024-09-04 ENCOUNTER — ANESTHESIA EVENT (OUTPATIENT)
Dept: SURGERY | Facility: HOSPITAL | Age: 39
End: 2024-09-04
Payer: COMMERCIAL

## 2024-09-05 NOTE — PRE-PROCEDURE INSTRUCTIONS
Patient was informed of pre-procedure instructions and arrival time. Pt verbalized understanding and is to be accompanied by WIFE.    Patient denies taking any over-the-counter vitamins, supplements, nsaids or aspirin products for 7 days.    Dear Willy ,     You are scheduled for a procedure with Dr. Delaney on 9/6/2024. Your scheduled arrival time is 6:45 am.  This arrival time is roughly 2 hours before your anticipated procedure time to allow sufficient time for pre-op.  Please wear comfortable clothing  This procedure will take place at the [facility] at the corner of [facility] and [facility].  It is in the [place] next to [facility]. The address is:     [address] [place] [address].  [place]LE[place]ardo [id number]     After entering the building, proceed to the second floor and check in with registration.      Your fasting instructions are as follows:     Nothing to eat after Midnight the evening before your surgery.   You may drink clear liquids up until 2 hours prior to your arrival time.      You MUST have a responsible adult to bring you home. Your surgery will be cancelled if you do not have a ride. (AN UBER OR TAXI ARE NOT ACCEPTED AS A RESPONSIBLE RIDE)     Please hold the following medications the morning of your procedure:  -Aspirin and Aspirin-containing products (Goody's powder, Excedrin)  -NSAIDs (Advil, Ibuprofen, Aleve, Diclofenac)  -Vitamins/Supplements   -Herbal remedies/Teas  -Stimulants (Adderall, Vyvanse, Adipex)  -Diabetic medication   -Prescription creams/gels/lotions        *May take Tylenol if needed         The evening before and morning of your procedure, take a shower using antibacterial soap (ex: Hibiclens or Dial antibacterial soap). DO NOT apply deodorant, lotion, cologne, or anything else to the skin. Do not wear jewelry or bring any valuables with you.  .     Please do not wear contact lenses the day of your procedure.   Bring any inhalers that you may  Refill approved as requested. need.     If you have any procedure-specific questions, please call your surgeon's office. Any other questions, don't hesitate to call at [phone].     Thanks,  Pre-Admit Testing  Anesthesia Dept OC

## 2024-09-05 NOTE — ANESTHESIA PREPROCEDURE EVALUATION
09/05/2024  [facility]  Anesthesia Pre-Operative Evaluation         Patient Name: Willy Branham  YOB: 1985  MRN: [id number]    SUBJECTIVE:     Pre-operative evaluation for Procedure(s) (LRB):  SEPTOPLASTY, NOSE, WITH NASAL TURBINATE REDUCTION (Bilateral)     09/05/2024    Willy Branham is a 39 y.o. male w/ a significant PMHx of DEWAYNE, obesity  Patient now presents for the above procedure(s).    TTE:   none documented.     Patient Active Problem List   Diagnosis    Snoring    Chronic diarrhea    Chronic LBP    Sleep apnea    DEWAYNE (obstructive sleep apnea)       Review of patient's allergies indicates:  No Known Allergies    Current Inpatient Medications:      No current facility-administered medications on file prior to encounter.     Current Outpatient Medications on File Prior to Encounter   Medication Sig Dispense Refill    ondansetron (ZOFRAN) 4 MG tablet Take 8 mg by mouth 2 (two) times daily as needed. (Patient not taking: Reported on 9/3/2024)      UBRELVY 100 mg tablet TAKE 1 TABLET BY MOUTH AS NEEDED (MIGRAINE)         Past Surgical History:   Procedure Laterality Date    APPENDECTOMY      NASAL SEPTOPLASTY      UVULOPALATOPHARYNGOPLASTY         OBJECTIVE:     Vital Signs Range (Last 24H):         Significant Labs:  Lab Results   Component Value Date    WBC 7.54 09/03/2024    HGB 14.8 09/03/2024    HCT 44.4 09/03/2024     09/03/2024    CHOL 220 (H) 02/16/2018    TRIG 170 (H) 02/16/2018    HDL 39 (L) 02/16/2018    ALT 32 09/03/2024    AST 28 09/03/2024     09/03/2024    K 4.6 09/03/2024     09/03/2024    CREATININE 1.0 09/03/2024    BUN 15 09/03/2024    CO2 26 09/03/2024    TSH 0.940 02/16/2018    HGBA1C 5.2 02/16/2018       Diagnostic Studies: No relevant studies.    EKG:   No results found for this or any previous visit.    ASSESSMENT/PLAN:            Pre-op Assessment    I have reviewed the Patient Summary Reports.     I have reviewed the Nursing Notes. I have reviewed the NPO Status.   I have reviewed the Medications.     Review of Systems  Anesthesia Hx:  No problems with previous Anesthesia             Denies Family Hx of Anesthesia complications.    Denies Personal Hx of Anesthesia complications.                    Hematology/Oncology:       -- Denies Anemia:                                  EENT/Dental:  EENT/Dental Normal           Cardiovascular:      Denies Hypertension.    Denies CAD.    Denies CABG/stent.  Denies Dysrhythmias.   Denies Angina.                                  Pulmonary:    Denies COPD.  Denies Asthma.   Denies Shortness of breath.  Sleep Apnea                Renal/:  Renal/ Normal                 Hepatic/GI:  Hepatic/GI Normal                 Neurological:  Denies TIA.  Denies CVA.                                    Endocrine:  Denies Diabetes.         Obesity / BMI > 30      Physical Exam  General: Cooperative, Alert and Oriented    Airway:  Mallampati: II   Mouth Opening: Normal  TM Distance: Normal  Tongue: Normal  Neck ROM: Normal ROM    Dental:  Intact        Anesthesia Plan  Type of Anesthesia, risks & benefits discussed:    Anesthesia Type: Gen ETT  Intra-op Monitoring Plan: Standard ASA Monitors  Post Op Pain Control Plan: multimodal analgesia and IV/PO Opioids PRN  Induction:  IV  Airway Plan: Video, Post-Induction  Informed Consent: Informed consent signed with the Patient and all parties understand the risks and agree with anesthesia plan.  All questions answered.   ASA Score: 2  Day of Surgery Review of History & Physical: H&P Update referred to the surgeon/provider.    Ready For Surgery From Anesthesia Perspective.     .

## 2024-09-06 ENCOUNTER — ANESTHESIA (OUTPATIENT)
Dept: SURGERY | Facility: HOSPITAL | Age: 39
End: 2024-09-06
Payer: COMMERCIAL

## 2024-09-06 ENCOUNTER — HOSPITAL ENCOUNTER (OUTPATIENT)
Facility: HOSPITAL | Age: 39
Discharge: HOME | End: 2024-09-06
Attending: OTOLARYNGOLOGY | Admitting: OTOLARYNGOLOGY
Payer: COMMERCIAL

## 2024-09-06 VITALS
RESPIRATION RATE: 10 BRPM | HEIGHT: 70 IN | TEMPERATURE: 97 F | WEIGHT: 210 LBS | BODY MASS INDEX: 30.06 KG/M2 | DIASTOLIC BLOOD PRESSURE: 75 MMHG | OXYGEN SATURATION: 95 % | SYSTOLIC BLOOD PRESSURE: 128 MMHG | HEART RATE: 68 BPM

## 2024-09-06 DIAGNOSIS — J34.89 NASAL OBSTRUCTION: ICD-10-CM

## 2024-09-06 PROCEDURE — 94761 N-INVAS EAR/PLS OXIMETRY MLT: CPT

## 2024-09-06 PROCEDURE — 30520 REPAIR OF NASAL SEPTUM: CPT | Mod: 51,,, | Performed by: OTOLARYNGOLOGY

## 2024-09-06 PROCEDURE — 71000016 HC POSTOP RECOV ADDL HR: Performed by: OTOLARYNGOLOGY

## 2024-09-06 PROCEDURE — 63600175 PHARM REV CODE 636 W HCPCS: Performed by: STUDENT IN AN ORGANIZED HEALTH CARE EDUCATION/TRAINING PROGRAM

## 2024-09-06 PROCEDURE — 37000008 HC ANESTHESIA 1ST 15 MINUTES: Performed by: OTOLARYNGOLOGY

## 2024-09-06 PROCEDURE — 25000003 PHARM REV CODE 250: Performed by: STUDENT IN AN ORGANIZED HEALTH CARE EDUCATION/TRAINING PROGRAM

## 2024-09-06 PROCEDURE — 71000033 HC RECOVERY, INTIAL HOUR: Performed by: OTOLARYNGOLOGY

## 2024-09-06 PROCEDURE — 63600175 PHARM REV CODE 636 W HCPCS: Performed by: OTOLARYNGOLOGY

## 2024-09-06 PROCEDURE — 36000707: Performed by: OTOLARYNGOLOGY

## 2024-09-06 PROCEDURE — 21235 EAR CARTILAGE GRAFT: CPT | Mod: 51,,, | Performed by: OTOLARYNGOLOGY

## 2024-09-06 PROCEDURE — 63600175 PHARM REV CODE 636 W HCPCS: Performed by: NURSE ANESTHETIST, CERTIFIED REGISTERED

## 2024-09-06 PROCEDURE — 36000706: Performed by: OTOLARYNGOLOGY

## 2024-09-06 PROCEDURE — 25000003 PHARM REV CODE 250: Performed by: OTOLARYNGOLOGY

## 2024-09-06 PROCEDURE — 30465 REPAIR NASAL STENOSIS: CPT | Mod: ,,, | Performed by: OTOLARYNGOLOGY

## 2024-09-06 PROCEDURE — 99900035 HC TECH TIME PER 15 MIN (STAT)

## 2024-09-06 PROCEDURE — 25000003 PHARM REV CODE 250: Performed by: NURSE ANESTHETIST, CERTIFIED REGISTERED

## 2024-09-06 PROCEDURE — 27201423 OPTIME MED/SURG SUP & DEVICES STERILE SUPPLY: Performed by: OTOLARYNGOLOGY

## 2024-09-06 PROCEDURE — 30140 RESECT INFERIOR TURBINATE: CPT | Mod: 50,51,, | Performed by: OTOLARYNGOLOGY

## 2024-09-06 PROCEDURE — 71000015 HC POSTOP RECOV 1ST HR: Performed by: OTOLARYNGOLOGY

## 2024-09-06 PROCEDURE — 37000009 HC ANESTHESIA EA ADD 15 MINS: Performed by: OTOLARYNGOLOGY

## 2024-09-06 RX ORDER — OXYMETAZOLINE HCL 0.05 %
SPRAY, NON-AEROSOL (ML) NASAL
Status: DISCONTINUED | OUTPATIENT
Start: 2024-09-06 | End: 2024-09-06 | Stop reason: HOSPADM

## 2024-09-06 RX ORDER — BACITRACIN ZINC 500 UNIT/G
OINTMENT (GRAM) TOPICAL
Status: DISCONTINUED | OUTPATIENT
Start: 2024-09-06 | End: 2024-09-06 | Stop reason: HOSPADM

## 2024-09-06 RX ORDER — FENTANYL CITRATE 50 UG/ML
INJECTION, SOLUTION INTRAMUSCULAR; INTRAVENOUS
Status: DISCONTINUED | OUTPATIENT
Start: 2024-09-06 | End: 2024-09-06

## 2024-09-06 RX ORDER — SODIUM CHLORIDE 9 MG/ML
INJECTION, SOLUTION INTRAVENOUS CONTINUOUS
Status: DISCONTINUED | OUTPATIENT
Start: 2024-09-06 | End: 2024-09-06 | Stop reason: HOSPADM

## 2024-09-06 RX ORDER — LIDOCAINE HYDROCHLORIDE 20 MG/ML
INJECTION INTRAVENOUS
Status: DISCONTINUED | OUTPATIENT
Start: 2024-09-06 | End: 2024-09-06

## 2024-09-06 RX ORDER — MUPIROCIN 20 MG/G
OINTMENT TOPICAL 2 TIMES DAILY
Qty: 22 G | Refills: 0 | Status: SHIPPED | OUTPATIENT
Start: 2024-09-06 | End: 2024-09-20

## 2024-09-06 RX ORDER — SODIUM CHLORIDE 0.9 % (FLUSH) 0.9 %
10 SYRINGE (ML) INJECTION DAILY PRN
Status: DISCONTINUED | OUTPATIENT
Start: 2024-09-06 | End: 2024-09-06 | Stop reason: HOSPADM

## 2024-09-06 RX ORDER — PROCHLORPERAZINE EDISYLATE 5 MG/ML
5 INJECTION INTRAMUSCULAR; INTRAVENOUS EVERY 30 MIN PRN
Status: DISCONTINUED | OUTPATIENT
Start: 2024-09-06 | End: 2024-09-06 | Stop reason: HOSPADM

## 2024-09-06 RX ORDER — GLUCAGON 1 MG
1 KIT INJECTION
Status: DISCONTINUED | OUTPATIENT
Start: 2024-09-06 | End: 2024-09-06 | Stop reason: HOSPADM

## 2024-09-06 RX ORDER — OXYCODONE HYDROCHLORIDE 5 MG/1
5 TABLET ORAL
Status: DISCONTINUED | OUTPATIENT
Start: 2024-09-06 | End: 2024-09-06 | Stop reason: HOSPADM

## 2024-09-06 RX ORDER — KETAMINE HCL IN 0.9 % NACL 50 MG/5 ML
SYRINGE (ML) INTRAVENOUS
Status: DISCONTINUED | OUTPATIENT
Start: 2024-09-06 | End: 2024-09-06

## 2024-09-06 RX ORDER — DEXAMETHASONE SODIUM PHOSPHATE 4 MG/ML
INJECTION, SOLUTION INTRA-ARTICULAR; INTRALESIONAL; INTRAMUSCULAR; INTRAVENOUS; SOFT TISSUE
Status: DISCONTINUED | OUTPATIENT
Start: 2024-09-06 | End: 2024-09-06

## 2024-09-06 RX ORDER — HYDROMORPHONE HYDROCHLORIDE 1 MG/ML
0.2 INJECTION, SOLUTION INTRAMUSCULAR; INTRAVENOUS; SUBCUTANEOUS EVERY 5 MIN PRN
Status: DISCONTINUED | OUTPATIENT
Start: 2024-09-06 | End: 2024-09-06 | Stop reason: HOSPADM

## 2024-09-06 RX ORDER — VITAMIN A 3000 MCG
2 CAPSULE ORAL EVERY 4 HOURS
Qty: 44 ML | Refills: 12 | Status: SHIPPED | OUTPATIENT
Start: 2024-09-06

## 2024-09-06 RX ORDER — MIDAZOLAM HYDROCHLORIDE 1 MG/ML
INJECTION INTRAMUSCULAR; INTRAVENOUS
Status: DISCONTINUED | OUTPATIENT
Start: 2024-09-06 | End: 2024-09-06

## 2024-09-06 RX ORDER — ACETAMINOPHEN 10 MG/ML
INJECTION, SOLUTION INTRAVENOUS
Status: DISCONTINUED | OUTPATIENT
Start: 2024-09-06 | End: 2024-09-06

## 2024-09-06 RX ORDER — LIDOCAINE HYDROCHLORIDE AND EPINEPHRINE 10; 10 MG/ML; UG/ML
INJECTION, SOLUTION INFILTRATION; PERINEURAL
Status: DISCONTINUED | OUTPATIENT
Start: 2024-09-06 | End: 2024-09-06 | Stop reason: HOSPADM

## 2024-09-06 RX ORDER — AMOXICILLIN AND CLAVULANATE POTASSIUM 875; 125 MG/1; MG/1
1 TABLET, FILM COATED ORAL 2 TIMES DAILY
Qty: 14 TABLET | Refills: 0 | Status: SHIPPED | OUTPATIENT
Start: 2024-09-06 | End: 2024-09-13

## 2024-09-06 RX ORDER — LIDOCAINE HYDROCHLORIDE 10 MG/ML
1 INJECTION, SOLUTION EPIDURAL; INFILTRATION; INTRACAUDAL; PERINEURAL ONCE AS NEEDED
Status: DISCONTINUED | OUTPATIENT
Start: 2024-09-06 | End: 2024-09-06 | Stop reason: HOSPADM

## 2024-09-06 RX ORDER — ONDANSETRON HYDROCHLORIDE 2 MG/ML
INJECTION, SOLUTION INTRAVENOUS
Status: DISCONTINUED | OUTPATIENT
Start: 2024-09-06 | End: 2024-09-06

## 2024-09-06 RX ORDER — OXYCODONE AND ACETAMINOPHEN 5; 325 MG/1; MG/1
1 TABLET ORAL EVERY 6 HOURS PRN
Qty: 20 TABLET | Refills: 0 | Status: SHIPPED | OUTPATIENT
Start: 2024-09-06

## 2024-09-06 RX ORDER — PROPOFOL 10 MG/ML
VIAL (ML) INTRAVENOUS
Status: DISCONTINUED | OUTPATIENT
Start: 2024-09-06 | End: 2024-09-06

## 2024-09-06 RX ORDER — EPINEPHRINE 1 MG/ML
INJECTION INTRAMUSCULAR; INTRAVENOUS; SUBCUTANEOUS
Status: DISCONTINUED | OUTPATIENT
Start: 2024-09-06 | End: 2024-09-06 | Stop reason: HOSPADM

## 2024-09-06 RX ORDER — ONDANSETRON 4 MG/1
4 TABLET, ORALLY DISINTEGRATING ORAL EVERY 6 HOURS PRN
Qty: 20 TABLET | Refills: 0 | Status: SHIPPED | OUTPATIENT
Start: 2024-09-06

## 2024-09-06 RX ORDER — ROCURONIUM BROMIDE 10 MG/ML
INJECTION, SOLUTION INTRAVENOUS
Status: DISCONTINUED | OUTPATIENT
Start: 2024-09-06 | End: 2024-09-06

## 2024-09-06 RX ORDER — CEFAZOLIN SODIUM 1 G/3ML
INJECTION, POWDER, FOR SOLUTION INTRAMUSCULAR; INTRAVENOUS
Status: DISCONTINUED | OUTPATIENT
Start: 2024-09-06 | End: 2024-09-06

## 2024-09-06 RX ORDER — DEXMEDETOMIDINE HYDROCHLORIDE 100 UG/ML
INJECTION, SOLUTION INTRAVENOUS
Status: DISCONTINUED | OUTPATIENT
Start: 2024-09-06 | End: 2024-09-06

## 2024-09-06 RX ADMIN — HYDROMORPHONE HYDROCHLORIDE 0.2 MG: 1 INJECTION, SOLUTION INTRAMUSCULAR; INTRAVENOUS; SUBCUTANEOUS at 01:09

## 2024-09-06 RX ADMIN — CEFAZOLIN 2 G: 330 INJECTION, POWDER, FOR SOLUTION INTRAMUSCULAR; INTRAVENOUS at 10:09

## 2024-09-06 RX ADMIN — ACETAMINOPHEN 1000 MG: 10 INJECTION INTRAVENOUS at 11:09

## 2024-09-06 RX ADMIN — SODIUM CHLORIDE: 0.9 INJECTION, SOLUTION INTRAVENOUS at 07:09

## 2024-09-06 RX ADMIN — LIDOCAINE HYDROCHLORIDE 60 MG: 20 INJECTION INTRAVENOUS at 10:09

## 2024-09-06 RX ADMIN — FENTANYL CITRATE 100 MCG: 50 INJECTION, SOLUTION INTRAMUSCULAR; INTRAVENOUS at 10:09

## 2024-09-06 RX ADMIN — SODIUM CHLORIDE, SODIUM GLUCONATE, SODIUM ACETATE, POTASSIUM CHLORIDE, MAGNESIUM CHLORIDE, SODIUM PHOSPHATE, DIBASIC, AND POTASSIUM PHOSPHATE: .53; .5; .37; .037; .03; .012; .00082 INJECTION, SOLUTION INTRAVENOUS at 10:09

## 2024-09-06 RX ADMIN — ONDANSETRON 4 MG: 2 INJECTION INTRAMUSCULAR; INTRAVENOUS at 10:09

## 2024-09-06 RX ADMIN — DEXMEDETOMIDINE 10 MCG: 100 INJECTION, SOLUTION INTRAVENOUS at 10:09

## 2024-09-06 RX ADMIN — Medication 15 MG: at 12:09

## 2024-09-06 RX ADMIN — SUGAMMADEX 150 MG: 100 INJECTION, SOLUTION INTRAVENOUS at 12:09

## 2024-09-06 RX ADMIN — DEXMEDETOMIDINE 10 MCG: 100 INJECTION, SOLUTION INTRAVENOUS at 12:09

## 2024-09-06 RX ADMIN — MIDAZOLAM 2 MG: 1 INJECTION INTRAMUSCULAR; INTRAVENOUS at 10:09

## 2024-09-06 RX ADMIN — PROPOFOL 200 MG: 10 INJECTION, EMULSION INTRAVENOUS at 10:09

## 2024-09-06 RX ADMIN — Medication 25 MG: at 10:09

## 2024-09-06 RX ADMIN — GLYCOPYRROLATE 0.1 MG: 0.2 INJECTION INTRAMUSCULAR; INTRAVENOUS at 10:09

## 2024-09-06 RX ADMIN — DEXAMETHASONE SODIUM PHOSPHATE 12 MG: 4 INJECTION, SOLUTION INTRA-ARTICULAR; INTRALESIONAL; INTRAMUSCULAR; INTRAVENOUS; SOFT TISSUE at 10:09

## 2024-09-06 RX ADMIN — ROCURONIUM BROMIDE 40 MG: 10 INJECTION, SOLUTION INTRAVENOUS at 10:09

## 2024-09-06 RX ADMIN — OXYCODONE 5 MG: 5 TABLET ORAL at 01:09

## 2024-09-06 NOTE — BRIEF OP NOTE
[facility]  Brief Operative Note    Surgery Date: 9/6/2024     Surgeons and Role:     * Miguelito Delaney III, MD - Primary    Assisting Surgeon: None    Pre-op Diagnosis:  Nasal valve collapse [M95.0]  Nasal septal deviation [J34.2]  Nasal turbinate hypertrophy [J34.3]  Nasal obstruction [J34.89]  Obstructive sleep apnea [G47.33]  Deviated nasal septum [J34.2]    Post-op Diagnosis:  Post-Op Diagnosis Codes:     * Nasal valve collapse [M95.0]     * Nasal septal deviation [J34.2]     * Nasal turbinate hypertrophy [J34.3]     * Nasal obstruction [J34.89]     * Obstructive sleep apnea [G47.33]     * Deviated nasal septum [J34.2]    Procedure(s) (LRB):  SEPTOPLASTY, NOSE, WITH NASAL TURBINATE REDUCTION (Bilateral)    Anesthesia: General    Operative Findings: see op note    Estimated Blood Loss: * No values recorded between 9/6/2024 10:52 AM and 9/6/2024 11:32 AM *         Specimens:   Specimen (24h ago, onward)      None              Discharge Note    OUTCOME: Patient tolerated treatment/procedure well without complication and is now ready for discharge.    DISPOSITION: Home or Self Care    FINAL DIAGNOSIS:  <principal problem not specified>    FOLLOWUP: In clinic    DISCHARGE INSTRUCTIONS:  No discharge procedures on file.

## 2024-09-06 NOTE — ANESTHESIA POSTPROCEDURE EVALUATION
Anesthesia Post Evaluation    Patient: Willy Branham    Procedure(s) Performed: Procedure(s) (LRB):  SEPTOPLASTY, NOSE, WITH NASAL TURBINATE REDUCTION (Bilateral)    Final Anesthesia Type: general      Patient location during evaluation: PACU  Patient participation: Yes- Able to Participate  Level of consciousness: awake and alert, oriented and awake  Post-procedure vital signs: reviewed and stable  Pain management: adequate  Airway patency: patent  DEWAYNE mitigation strategies: Multimodal analgesia  PONV status at discharge: No PONV  Anesthetic complications: no      Cardiovascular status: blood pressure returned to baseline and hemodynamically stable  Respiratory status: unassisted and spontaneous ventilation  Hydration status: euvolemic  Follow-up not needed.              Vitals Value Taken Time   /90 09/06/24 1301   Temp 36.3 °C (97.3 °F) 09/06/24 1233   Pulse 72 09/06/24 1308   Resp 12 09/06/24 1308   SpO2 99 % 09/06/24 1308   Vitals shown include unfiled device data.      No case tracking events are documented in the log.      Pain/Pierce Score: Pain Rating Prior to Med Admin: 5 (9/6/2024  1:06 PM)  Pierce Score: 7 (9/6/2024 12:33 PM)

## 2024-09-06 NOTE — DISCHARGE INSTRUCTIONS
Keep nasal packing in until follow up appointment with Dr. Delaney on Sept 11  Use salt water spray over the nasal packing every 4 hours to prevent drying/crusting.  If having to change out the moustache dressing often because of bleeding, can also use oxymetazoline (Afrin) nasal spray over the nasal packing three times a day to slow down bleeding.  Covering the ear packing with ointment once a  day. Ok to shower. This will also be removed next week   Do not use CPAP machine until nasal packing is removed.  To reduce swelling:  - Keep head of bed elevated at all times (approx 30 degrees).  - Minimize salt intake and vigorous chewing       DO NOT CALL [facility] ON CALL FOR POSTOPERATIVE PROBLEMS. CALL THE  [phone] AND ASK FOR ENT ON CALL.

## 2024-09-06 NOTE — TRANSFER OF CARE
"Anesthesia Transfer of Care Note    Patient: Willy Branham    Procedure(s) Performed: Procedure(s) (LRB):  SEPTOPLASTY, NOSE, WITH NASAL TURBINATE REDUCTION (Bilateral)    Patient location: PACU    Anesthesia Type: general    Transport from OR: Transported from OR on 6-10 L/min O2 by face mask with adequate spontaneous ventilation    Post pain: adequate analgesia    Post assessment: no apparent anesthetic complications    Post vital signs: stable    Level of consciousness: sedated    Nausea/Vomiting: no nausea/vomiting    Complications: none    Transfer of care protocol was followed      Last vitals: Visit Vitals  /84 (BP Location: Right arm, Patient Position: Lying)   Pulse 68   Temp 37.1 °C (98.8 °F) (Temporal)   Resp 16   Ht 5' 10" (1.778 m)   Wt 95.3 kg (210 lb)   SpO2 97%   BMI 30.13 kg/m²     "

## 2024-09-06 NOTE — H&P
I have seen and examined the patient in the pre-op area. There have been no significant interval changes to the history or physical examination as noted below. Plan is to proceed to the OR for Procedure(s):  SEPTOPLASTY, NOSE, WITH NASAL TURBINATE REDUCTION. Nasal valve reconstruction with auricular cartilage grafts    Abraham Lassiter MD  [facility] Otolaryngology, PGY3  09/06/2024 7:34 AM    Mr. Branham          Vitals:     06/26/24 0900   BP: 122/81   Pulse: 77         Chief Complaint:  Nasal obstruction      HPI:  Mr. Branham is a 38-year-old white male who presents referred by  and Reji for nasal airway obstruction.  Significant past history is positive for septoplasty approximately 8 years ago by .  He was recently evaluated by  with the following findings; He has been evaluated by Dr. Tristan Dixon and most recently had a custom oral appliance which he has used for the past few months.  He has been unable to tolerate CPAP due to nasal congestion and resistance.  He has some relief with nasal strips and magnetic nasal dilators, though use of these is limited by circumstance.  He denies facial pressure, postnasal drip, hyposmia or aural fullness.   Current sinonasal medications include flonase for over 8 weeks, as well as nasal decongestant sprays, xlear and saline.   He was also found to have persistent nasal septal deviation and hypertrophic turbinates as well as valve collapse.  He does have a diagnosis of DEWAYNE and has tried multiple remedies for this with limited success.     SNOT22- 31 NOSE- 85     Review of Systems:  Constitutional:   weight loss or weight gain: Negative  Allergy/Immunologic:   Negative  Nasal Congestion/Obstruction:   Positive as above  Nosebleeds:   Negative  Sinus infections:   Negative  Headache/Facial Pain:   Negative  Snoring/DEWAYNE:   Positive for DEWAYNE as above  Throat: Infections/Pain:   Negative  Hoarseness/Speech Disturbance:   Negative  Trauma Hx:  Negative      Cardiovascular:  M/I Angina: Negative  Hypertension: Negative  Endocrine:              DM/Steroids: Negative  GI:   Dysphagia/Reflux: Negative  :   GYN Pregnancy: Negative  Renal:   Dialysis: Negative  Lymphatic:   Neck Mass/Lymphadenopathy: Negative  Muscoloskeletal:   Negative  Hematologic:   Bleeding Disorders/Anemia: Negative  Neurologic:    Cranial/Neuralgia: Negative  Pulmonary:   Asthma/SOB/Cough: Negative  Skin Disorders: Negative     Past Medical/Surgical/Family/Social History:     ENT Surgery:  Status post previous septoplasty as well as UPPP  Occupational Exposure: Negative   Problems: Negative  Cancer: Negative     Past Family History:              Family history of Cancer: Negative     Past Social History:              Tobacco: Nonsmoker              Alcohol:  3-5 times weekly Social Drinker        Allergies and medications: Reviewed per med card.     Physical Examination:  Ears:              External auditory canals:  Clear              Hearing: Grossly intact              Tympanic Membranes: Clear  Nose:              External:  Bilateral external valve collapse with moderate inspiration, positive Nash maneuver.              Intranasal:  Septal deviation to the left with 2+ turbinates.  All of these deformities together creating 90% obstruction.  Mouth:              Intraorally: Lips, teeth, and gums: Normal              Oropharynx: Normal              Mucosa: Normal              Tongue: Normal  Throat:                            Palate:  Status post UPPP              Tonsils:  Absent              Posterior Pharynx: Normal  Fiberoptic exam: Not performed  Head/Face:      Inspection: Normal and atraumatic              Palpation/Percussion: Non tender              Facial strength: Normal and symmetric              Salivary glands: Normal  Neck: Supple  Thyroid: No masses  Lymphatics: No nodes  Respiratory:              Effort: Normal  Eyes:              Ocular Mobility: Normal               Vision: Grossly intact  Neuro/Psych:              Cranial Nerves: Grossly Intact              Orientation: Normal              Mood/Affect: Normal        Assessment/Plan:  I have discussed my findings with him in detail as well as my recommendations for treatment.  We have discussed that he could benefit from nasal reconstruction with bilateral auricular cartilage Batten grafts, revision septoplasty, submucous resection of turbinates.  Utilizing Q-tips I have demonstrated how the Batten grafts would change the shape of his nose and support his external valves.  I have discussed the pros and cons risks and benefits as well as technical aspects of this procedure in detail with him.  He understands and accepts these and wishes to proceed with scheduling.  I will send him for medical photographs as well.

## 2024-09-06 NOTE — ANESTHESIA PROCEDURE NOTES
Intubation    Date/Time: 9/6/2024 10:35 AM    Performed by: Marquez Sanchez CRNA  Authorized by: Nam Becerril MD    Intubation:     Induction:  Intravenous    Intubated:  Postinduction    Mask Ventilation:  Easy mask    Attempts:  1    Attempted By:  AGUSTIN    Blade:  Beltre 3    Laryngeal View Grade: Grade I - full view of cords      Difficult Airway Encountered?: No      Complications:  None    Airway Device:  Oral ceci    Airway Device Size:  7.5    Style/Cuff Inflation:  Cuffed (inflated to minimal occlusive pressure)    Inflation Amount (mL):  6    Tube secured:  22    Secured at:  The lips    Placement Verified By:  Capnometry    Complicating Factors:  None    Findings Post-Intubation:  BS equal bilateral and atraumatic/condition of teeth unchanged

## 2024-09-06 NOTE — OP NOTE
DATE OF PROCEDURE: 09/06/2024    SURGEON: Miguelito Delaney III, M.D.     ASSISTANT SURGEON: Abraham Lassiter M.D. (RES).     PRE-OPERATIVE DIAGNOSIS:  1. Nasal Valve Collapse    2. Nasal Septal Deviation    3. Inferior Turbinate Hypertrophy     POST-OPERATIVE DIAGNOSIS:  1. Nasal Valve Collapse    2. Nasal Septal Deviation    3. Inferior Turbinate Hypertrophy     PROCEDURES PERFORMED:   1. Nasal Reconstruction with bilateral auricular cartilage batten graft CPT 18067     2. Bilateral Auricular Lauri Graft CPT 34703-36   3. Revision Septoplasty, CPT 51014.   4. Submucous resection of the turbinates, CPT 14505-18.     ANESTHESIA: General endotracheal anesthesia.    MEDS AND IV FLUIDS: Please see Anesthesia's report.     SPECIMENS:   None    ESTIMATED BLOOD LOSS: Minimal     COMPLICATIONS: None apparent.     INDICATIONS FOR PROCEDURE: Willy Branham is a 39 y.o. male with history or prior septoplasty   who presented to outpatient clinic for evaluation of nasal obstruction despite medical therapy.  On anterior rhinoscopy, patient was noted to have a  persistent deviated nasal septum to right with external nasal valve collapse. The risks, benefits, and alternatives to nasal reconstruction with auricular batten grafts, septoplasty and inferior turbinate reduction were explained to the patient in detail. Pt expressed understanding, and elected to proceed with the aforementioned procedure.     PROCEDURE IN DETAIL: After it was confirmed that consents were obtained and history and physical was up-to-date, the patient was brought back by Anesthesia where he was successfully intubated without any complications. The head of the bed was turned 90 degrees. The area was prepped, draped and injected in standard fashion to prepare for septoplasty, turbinate reduction, and nasal reconstruction. Approximately,8 mL of lidocaine with epinephrine infiltrated in the bilateral auricular katie, nasal septum , as well as the inferior  turbinates bilaterally.     First, the right ear was incised sharply along the inner portion of the anti helix and a skin flap was raised to display the underlying cartilage of the katie. The cartilage was then sharply incised in the shape of a jelly bean and the cartilage was removed and passed to the back table to be saved for batten grafts. The incision was then closed with 5-0 chromic suture, first in a simple interrupted fashion to tack the skin flap to the ear, and then in a running, locking fashion to close the incision. Telfa was then sutured anteriorly and posteriorly to serve as a bolster to prevent hematoma formation. Next, the left ear was sharply excised and cartilage harvested in the exact same maneuver. The cartilage was saved and the incision closed as above.     Next, a curvilinear incision was made along the right septum and a mucoperichondrial-mucoperiosteal flap was elevated along   the right, back to the level and beyond the bony cartilaginous junction.   The bony-cartilaginous junction was stippled to then raise the mucoperiosteal flap on the left. As soon as adequate elevation had been achieved on both sides, heavy scissors were utilized to make a superior and    an inferior cut along the bony part of the septum, and these were removed with Teja forceps. An superior bony spur was sharply excised with the use of Teja forceps on the right. Reapproximating the 2 mucoperichondrial-mucoperiosteal flaps, septum was noted to be straight with a patent nasal airway. At this point, the septoplasty portion of the procedure was complete. The hemitransfixion incision was closed anteriorly with 4-0 Chromic sutures in simple interrupted fashion. Next, a flaps were reapproximated with the septal stapler. Next, using the microdebrider, a   submucous resection of the turbinates was carried out bilaterally in usual   fashion. Once adequate reduction of each turbinate was obtained, a Decker   elevator  was utilized to outfracture the inferior turbinates bilaterally.This completed the submucous resection of turbinates portion of the   Procedure.     Next marginal intercartilaginous incisions were made with a 15c blade bilaterally. Blunt dissection was enable to create an intercartilaginous pocket. The left sided auricular graft was trimmed and affixed on the right. Right sided auricular graft was trimmed and affixed on the left. Adequate external nasal valve support was achieved. The marginal incisions were re approximated using 4-0 chromic suture in a simple interrupted fashion.        Septal splints were inserted and sutured to the septum. Next, nasal packing was inserted bilaterally and sutured with a 4-0 nylon stitch. An external nasal cast was placed.   At this point, the procedure was deemed complete. The patient's stomach contents were suctioned with an orogastric tube. The patient was then   turned back towards Anesthesia, where he was extubated without incident and brought back to PACU, where he is in a stable condition at the time of this dictation. Dr. Delaney was present and performed all portions of this procedure.

## 2024-09-09 ENCOUNTER — PATIENT MESSAGE (OUTPATIENT)
Dept: OTOLARYNGOLOGY | Facility: CLINIC | Age: 39
End: 2024-09-09
Payer: COMMERCIAL

## 2024-09-10 NOTE — TELEPHONE ENCOUNTER
Rescheduled POV from 09/11/24 to 09/12/24. Per dr Rhett myers to take Tylenol between pain doses. Advised patient of this, he voiced understanding.

## 2024-09-12 ENCOUNTER — OFFICE VISIT (OUTPATIENT)
Dept: OTOLARYNGOLOGY | Facility: CLINIC | Age: 39
End: 2024-09-12
Payer: COMMERCIAL

## 2024-09-12 VITALS — DIASTOLIC BLOOD PRESSURE: 87 MMHG | SYSTOLIC BLOOD PRESSURE: 128 MMHG | HEART RATE: 74 BPM

## 2024-09-12 DIAGNOSIS — Z98.890 POST-OPERATIVE STATE: ICD-10-CM

## 2024-09-12 DIAGNOSIS — J34.2 NASAL SEPTAL DEVIATION: ICD-10-CM

## 2024-09-12 DIAGNOSIS — M95.0 NASAL VALVE COLLAPSE: Primary | ICD-10-CM

## 2024-09-12 PROCEDURE — 3079F DIAST BP 80-89 MM HG: CPT | Mod: CPTII,S$GLB,, | Performed by: OTOLARYNGOLOGY

## 2024-09-12 PROCEDURE — 1159F MED LIST DOCD IN RCRD: CPT | Mod: CPTII,S$GLB,, | Performed by: OTOLARYNGOLOGY

## 2024-09-12 PROCEDURE — 3074F SYST BP LT 130 MM HG: CPT | Mod: CPTII,S$GLB,, | Performed by: OTOLARYNGOLOGY

## 2024-09-12 PROCEDURE — 99999 PR PBB SHADOW E&M-EST. PATIENT-LVL III: CPT | Mod: PBBFAC,,, | Performed by: OTOLARYNGOLOGY

## 2024-09-12 PROCEDURE — 99024 POSTOP FOLLOW-UP VISIT: CPT | Mod: S$GLB,,, | Performed by: OTOLARYNGOLOGY

## 2024-09-12 NOTE — PROGRESS NOTES
One week S/P nasal reconstruction with bilateral auricular cartilage Batten grafts, revision septo,turbs.  All packs,splints,and sutures removed.  Healing well,septum and nose straight,airway clear.  Reviewed post op instructions including nasal saline sprays.  RTC 3 weeks

## 2024-10-02 ENCOUNTER — OFFICE VISIT (OUTPATIENT)
Dept: OTOLARYNGOLOGY | Facility: CLINIC | Age: 39
End: 2024-10-02
Payer: COMMERCIAL

## 2024-10-02 ENCOUNTER — PATIENT MESSAGE (OUTPATIENT)
Dept: OTOLARYNGOLOGY | Facility: CLINIC | Age: 39
End: 2024-10-02

## 2024-10-02 VITALS
SYSTOLIC BLOOD PRESSURE: 120 MMHG | DIASTOLIC BLOOD PRESSURE: 85 MMHG | BODY MASS INDEX: 31.13 KG/M2 | HEART RATE: 80 BPM | WEIGHT: 216.94 LBS

## 2024-10-02 DIAGNOSIS — J34.829 NASAL VALVE COLLAPSE: Primary | ICD-10-CM

## 2024-10-02 DIAGNOSIS — Z98.890 POST-OPERATIVE STATE: ICD-10-CM

## 2024-10-02 DIAGNOSIS — J34.2 NASAL SEPTAL DEVIATION: ICD-10-CM

## 2024-10-02 PROCEDURE — 99999 PR PBB SHADOW E&M-EST. PATIENT-LVL III: CPT | Mod: PBBFAC,,, | Performed by: OTOLARYNGOLOGY

## 2024-10-02 PROCEDURE — 99024 POSTOP FOLLOW-UP VISIT: CPT | Mod: S$GLB,,, | Performed by: OTOLARYNGOLOGY

## 2024-10-02 PROCEDURE — 3074F SYST BP LT 130 MM HG: CPT | Mod: CPTII,S$GLB,, | Performed by: OTOLARYNGOLOGY

## 2024-10-02 PROCEDURE — 3079F DIAST BP 80-89 MM HG: CPT | Mod: CPTII,S$GLB,, | Performed by: OTOLARYNGOLOGY

## 2024-10-02 PROCEDURE — 1159F MED LIST DOCD IN RCRD: CPT | Mod: CPTII,S$GLB,, | Performed by: OTOLARYNGOLOGY

## 2024-10-02 PROCEDURE — 1160F RVW MEDS BY RX/DR IN RCRD: CPT | Mod: CPTII,S$GLB,, | Performed by: OTOLARYNGOLOGY

## 2024-10-02 NOTE — PROGRESS NOTES
CC: right knee, upper back, and low back pain     Willy is here today for follow up evaluation of his right knee, upper back, and lower back pain. Patient reports his pain is 2/10 today. He did have sinus surgery shortly after his last visit and only recently did he start returning to some exercise activity.    Recall from visit on 09/03/2024  39 y.o. Male presents today for evaluation of his chronic right knee, upper back pain, and low back pain. In regards to the right knee pain, he states that it has been worsening over the past two years as he returns to running more frequently at a higher mileage. The pain is diffuse over the right knee without specific points of tenderness. He has done physical therapy specifically targeted for runners in the past which helped. He states he had imaging, including MRI, of the right knee without evidence of right knee pathology. The right knee pain radiates into the calf musculature when it occurs. Knee pain worsens with increased running and twisting of the knee while standing. At rest, he denies pain. He denies feelings of instability or mechanical symptoms.     In regards to the upper and lower back pain, he has had this pain for 20+ years. The upper back pain is located in the upper back musculature and feels that it may be worsening migraines/cervicogenic headaches. Right and left side hurt about the same. He denies shoulder pain. Low back pain in the past radiated down the right leg but does not currently. The pain stays in the lumbar spine with left side worse than right. He states previous imaging has not alluded to lumbar or cervical spine pathology. Low back pain worsens with certain movements causing strain on low back. He does not have pain at rest today. He has seen a chiropractor in the past with some improvement. He has also had two epidurals for the lumbar spine which did provide relief for the right radicular symptoms. Pain can make it difficult to play with  his young children, etc. He does work constantly on computer which does seem to worsen neck/back strain. 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 He was a swimmer in the past.       PAST MEDICAL HISTORY:   Past Medical History:   Diagnosis Date    Migraine headache 2017    4-6 per year    Seasonal allergies Spring 2024    Sneezy. Runny/stuffy nowe    Sleep apnea 5/2013    Working on [employer]. To be discussed further.       PAST SURGICAL HISTORY:   Past Surgical History:   Procedure Laterality Date    APPENDECTOMY      NASAL SEPTOPLASTY      NASAL SEPTUM SURGERY  2014    SEPTOPLASTY, NOSE, WITH NASAL TURBINATE REDUCTION Bilateral 09/06/2024    Procedure: SEPTOPLASTY, NOSE, WITH NASAL TURBINATE REDUCTION;  Surgeon: Miguelito Delaney III, MD;  Location: [facility];  Service: Plastics;  Laterality: Bilateral;  Nasal Reconstruction; auricular cart [id number]    SINUS SURGERY  2014    UVULOPALATOPHARYNGOPLASTY         FAMILY HISTORY:   Family History   Problem Relation Name Age of Onset    No Known Problems Mother      Cancer Father Oleg         renal cell ca    Hypertension Father Oleg     Stroke Maternal Grandmother Veronika Lund     Cancer Paternal Grandmother Natalie Branham         breast ca       SOCIAL HISTORY:   Social History     Socioeconomic History    Marital status:    Tobacco Use    Smoking status: Never    Smokeless tobacco: Never    Tobacco comments:     social cigars   Substance and Sexual Activity    Alcohol use: Yes     Alcohol/week: 7.0 standard drinks of alcohol     Types: 4 Glasses of wine, 3 Cans of beer per week    Drug use: No    Sexual activity: Yes     Partners: Female     Birth control/protection: Condom, Partner-Vasectomy, See Surgical Hx   Social History Narrative    Pt works in [employer] lives with wife and has 17 month old . No pets.      Social Drivers of Health     Financial Resource Strain: Low Risk   (7/8/2024)    Overall Financial Resource Strain (CARDIA)     Difficulty of Paying Living Expenses: Not hard at all   Food Insecurity: No Food Insecurity (7/8/2024)    Hunger Vital Sign     Worried About Running Out of Food in the Last Year: Never true     Ran Out of Food in the Last Year: Never true   Transportation Needs: No Transportation Needs (9/17/2023)    Received from [facility] - Transportation     Lack of Transportation (Medical): No     Lack of Transportation (Non-Medical): No   Physical Activity: Sufficiently Active (7/8/2024)    Exercise Vital Sign     Days of Exercise per Week: 4 days     Minutes of Exercise per Session: 40 min   Stress: No Stress Concern Present (7/8/2024)    [employer] [place] [facility] [employer] [facility] - Occupational Stress Questionnaire     Feeling of Stress : Not at all   Housing Stability: Unknown (7/8/2024)    Housing Stability Vital Sign     Unable to Pay for Housing in the Last Year: No       MEDICATIONS:     Current Outpatient Medications:     sodium chloride (SALINE NASAL) 0.65 % nasal spray, 2 sprays by Nasal route every 4 (four) hours. Every 4 hours while awake (starting postop day 1) apply to bilateral nasal cavities, Disp: 44 mL, Rfl: 12    UBRELVY 100 mg tablet, TAKE 1 TABLET BY MOUTH AS NEEDED (MIGRAINE), Disp: , Rfl:     ondansetron (ZOFRAN) 4 MG tablet, Take 8 mg by mouth 2 (two) times daily as needed. (Patient not taking: Reported on 10/3/2024), Disp: , Rfl:     ondansetron (ZOFRAN-ODT) 4 MG TbDL, Dissolve 1 tablet (4 mg total) on the tongue every 6 (six) hours as needed (nausea/vomiting). (Patient not taking: Reported on 10/3/2024), Disp: 20 tablet, Rfl: 0    oxyCODONE-acetaminophen (PERCOCET) 5-325 mg per tablet, Take 1 tablet by mouth every 6 (six) hours as needed for Pain (refractory to over the counter pain medications). Note this medication contains tylenol/acetaminophen. Do not exceed >3000mg in 24hr period of tylenol., Disp: 20 tablet, Rfl:  "0    ALLERGIES:   Review of patient's allergies indicates:  No Known Allergies     PHYSICAL EXAMINATION:  /83   Ht 5' 10" (1.778 m)   Wt 98.4 kg (216 lb 14.9 oz)   BMI 31.13 kg/m²   Vitals signs and nursing note have been reviewed.  General: In no acute distress, well developed, well nourished, no diaphoresis  Eyes: EOM full and smooth, no eye redness or discharge  HENT: normocephalic and atraumatic, neck supple, trachea midline, no nasal discharge, no external ear redness or discharge  Cardiovascular: 2+ and symmetric radial bilaterally, no LE edema  Lungs: respirations non-labored, no conversational dyspnea   Abd: non-distended, no rigidity  MSK: no amputation or deformity, no swelling of extremities  Neuro: AAOx3, CN2-12 grossly intact  Skin: No rashes, warm and dry  Psychiatric: cooperative, pleasant, mood and affect appropriate for age    SHOULDER: LEFT  The affected shoulder is compared to the contralateral shoulder.    Observation:    CERVICAL SPINE  Normal head carriage. + mild thoracic kyphosis.  Slight decrease in ROM of lateral rotation; otherwise, Full AROM in flexion, extension, sidebending.    SHOULDER  No ecchymosis, edema, or erythema throughout the shoulder girdle.  No sternal, clavicular, or acromial deformities bilaterally.  No atrophy of the pectorals, deltoids, supraspinatus, infraspinatus, or biceps bilaterally.  No asymmetry of shoulders bilaterally.    ROM:  Active flexion to 180° on left and 180° on right.   Active abduction to 180° on left and 180° on right.    No scapular dyskinesia or winging.    Tenderness:  No tenderness at the SC or AC joint  No tenderness over the clavicle   No tenderness over biceps tendon in the bicipital groove  No tenderness over subacromial space  + tenderness over the shoulder girdle bilaterally, more so on the left    Strength Testing: (*pain)  Deltoid - 5/5 on left and 5/5 on right  Biceps - 5/5 on left and 5/5 on right  Triceps - 5/5 on left and 5/5 " on right  Wrist extension - 5/5 on left and 5/5 on right  Wrist flexion - 5/5 on left and 5/5 on right   - 5/5 on left and 5/5 on right  Finger extension - 5/5 on left and 5/5 on right  Finger abduction - 5/5 on left and 5/5 on right    Neurovascular Exam:  2+ radial pulses BL  Sensation intact to light touch in the distal median, radial, and ulnar nerve distributions bilaterally.  Spurlings test - negative  Capillary refill intact <2 seconds in all digits bilaterally    MUSCULOSKELETAL EXAM:    RIGHT KNEE EXAMINATION   Affected side is compared to contralateral knee     Observation:  No edema, erythema, ecchymosis, or effusion noted.  No muscle atrophy of the thighs and calves noted.  No obvious bony deformities noted.   No patella romulo or baja noted.  No genu valgus/varum noted. No recurvatum noted.    No tibial internal/external torsion.      Tenderness:  Patella - none    Lateral joint line - none  Quad tendon - none   Medial joint line - none  Patellar tendon - none  Medial plica - none  Tibial tubercle - none   Lateral plica - none  Pes anserine - none   MCL prox - none  Distal ITB - none   MCL distal - none  MFC - none    LCL prox - none  LFC - none    LCL distal - none  Tibia - none    Fibula - none    No obvious bursae, plicae, popliteal cysts, or tendon derangement palpated.          ROM:   Active extension to 0° bilaterally without hyperextension, lag, crepitus, or patellar J sign.  Active flexion to 135° bilaterally.    Strength: (bilaterally)  Knee Flexion - 5/5  Knee Extension - 5/5  Hip Flexion - 5/5  Hip Extension - 5/5  Ankle dorsiflexion - 5/5  Ankle Plantarflexion - 5/5    Patellofemoral Exam:  Patella position - Neutral   Patellar ballottement - negative  Bulge sign - negative  Patellar grind - positive right  No patellar laxity with medial and lateral translation   No apprehension with medial and lateral patellar translation.     Meniscus Testing:     No pain with terminal extension and  flexion.  Salinass test - negative   Thesaly test - negative    Ligament Testing:  Lachman's test - negative  No laxity with anterior drawer.  No laxity with posterior drawer.    No posterior sag sign.   No laxity with varus testing at 0 and 30 degrees.  No laxity with valgus testing at 0 and 30 degrees.    Neurovascular Examination:   Normal gait without antalgia.  Sensation intact to light touch in the obturator, lateral/intermediate/medial/posterior femoral cutaneous, saphenous, and common peroneal nerves bilaterally.  Pulses intact at the DP and PT arteries bilaterally.    Capillary refill intact <2 seconds in all toes bilaterally.    Lumbar Spine: bilateral lumbar region    Observation:    Normal cervicothoracolumbar curves.    No obvious pelvic obliquity while standing.    No edema, erythema, or ecchymosis noted in lumbosacral region.    No midline skin abnormalities.    No atrophy of lower limb musculature.  Leg length discrepancy with right longer than left through landmark visualization    Anterior rotation of right hip with elevated PSIS     Tenderness:  + tenderness over the right lumbar spine, iliolumbar region, posterior pelvis.  No tenderness over the sacrum, piriformis, greater/lesser trochanters.  No bony deformities or step-offs palpated.     Range of Motion:  Active flexion to 60°.   Active extension to 25°.   Active rotation to 30° bilaterally.    Active sidebending to 25° bilaterally.  Passive hip flexion to 135° bilaterally.    Passive hip internal rotation to 45° bilaterally.    Passive hip external rotation to 45° bilaterally.    All ROM testing is without pain.    Strength Testing:  Hip flexion - 5/5  Hip extension - 5/5  Knee flexion - 5/5  Knee extension - 5/5  Dorsiflexion - 5/5  Plantarflexion - 5/5  Great toe extension - 5/5    Special Tests:  Seated straight leg raise - negative  NAVEEN test - negative  FADIR test - negative      Posture:  Upright and Anterior pelvic tilt with  increased lumbar lordosis  Gait: Non-antalgic     TART (Tissue texture abnormality, Asymmetry,  Restriction of motion and/or Tenderness) changes:    Head:      Cervical Spine  Thoracic Spine  Lumbar Spine   C1  T1 Neutral L1 ERSR   C2  T2 ERSR L2 Neutral   C3  T3 ERSR L3 Neutral   C4  T4 NSLRR L4 ERSR   C5  T5 NSLRR L5 FRSR   C6  T6 NSLRR     C7  T7 Neutral       T8 Neutral       T9 Neutral       T10 Neutral       T11 FRSR       T12 ERSR       Ribs:  Exhalation restriction of the left upper ribcage  Superior rib 1 on the left    Upper Extremity:  Periscapular MFR on the right    Abdomen:    Pelvis:  Innominate:Right anterior rotation  Pubic bone:Neutral    Sacrum:Left on Right sacral torsion    Lower Extremity:  Internal tibial torsion on the right      Key   F= Flexed   E = Extended   R = Rotated   N = Neutral   S = Sidebent   TTA = tissue texture abnormality   L/R/B (last letter) = left/right/bilateral   HTP = fascial herniated trigger point   TB = fascial trigger band   CD = fascial continuum distortion   MFR = myofascial restriction       Neurovascular Exam:  Sensation intact to light touch in the L2-S2 dermatomes bilaterally.   No pretibial edema or abnormal hair pattern of the shin.    Normal gait without trendelenberg.      IMAGIN. X-ray obtained 2024 due to left shoulder pain   2. X-ray images were reviewed personally by me and then directly with patient.  3. FINDINGS: X-ray images obtained demonstrate no evidence of recent or healing fracture, lytic destructive process, or appreciable glenohumeral arthritic change observed. No glenohumeral dislocation. No abnormal soft tissue calcifications.   4. IMPRESSION: As above.     1. X-ray obtained 2024 due to right knee pain   2. X-ray images were reviewed personally by me and then directly with patient.  3. FINDINGS: X-ray images obtained demonstrate no significant degree of tibiofemoral or patellofemoral joint space narrowing is observed. Osseous  structures demonstrate no evidence of recent or healing fracture, lytic destructive process, or osteochondral defect. No significant joint effusion is observed on either side.   4. IMPRESSION: As above.       ASSESSMENT:      ICD-10-CM ICD-9-CM   1. Chronic pain of right knee  M25.561 719.46    G89.29 338.29   2. Chronic left shoulder pain  M25.512 719.41    G89.29 338.29   3. Mechanical low back pain  M54.59 724.2   4. Upper back pain  M54.9 724.5   5. Myalgia  M79.10 729.1   6. Somatic dysfunction of lumbar region  M99.03 739.3   7. Somatic dysfunction of lower extremity  M99.06 739.6   8. Somatic dysfunction of sacral region  M99.04 739.4   9. Somatic dysfunction of thoracic region  M99.02 739.2   10. Somatic dysfunction of rib cage region  M99.08 739.8   11. Somatic dysfunction of pelvis region  M99.05 739.5   12. Somatic dysfunction of upper extremity  M99.07 739.7           PLAN:  Chronic right knee pain  Chronic left shoulder pain  3-5. Mechanical LBP/upper back pain/myalgia -     - Willy admits to chronic right knee pain and chronic lower and upper back pain.     - He denies worsening of symptoms following his last visit, but did have sinus surgery which required recovery and a time away from his normal exercise activity. He has started getting back into exercise this week without much issue which he is pleased with.    - Symptoms, exam are still most consistent with mechanical lower and upper back pain and chronic right knee pain due to overcompensation, poor neuromuscular firing and an acquired leg length discrepancy.  We discussed the importance of decreasing inflammation and strengthening and stabilizing to help promote and maintain symptom improvement/resolution.  This is commonly accomplished with a short course of an anti-inflammatory and icing in addition to osteopathic manipulation, a home exercise program or physical therapy.     - Based on his description of pain/body language and somatic  dysfunction identified on exam, I discussed osteopathic manipulation as a treatment option today. He consents to evaluation and treatment. See below.    -  Continue with HEP for abdominal bracing, ant marches, diaphragmatic breathing, pelvic clocks 6-12, shoulder circles, snow angels, cat/cow, Palestinian exercises prescribed at initial visit.       6-12. Somatic dysfunction of lumbar, pelvis, sacral, thoracic, lower extremity, upper extremity, ribcage regions -     - OMT 7-8 regions. Oral consent obtained. Reviewed benefits and potential side effects. OMT indicated today due to signs and symptoms as well as local and remote somatic dysfunction findings and their related neurokinetic, lymphatic, fascial and/or arteriovenous body connections. OMT techniques used: Soft Tissue, Myofascial Release, Muscle Energy, High Velocity Low Amplitude, and Articulatory. Treatment was tolerated well. Improvement noted in segmental mobility post-treatment in dysfunctional regions. There were no adverse events and no complications immediately following treatment. Advised plenty of water to help alleviate soreness.      Future planning includes - possibly more OMT if helpful and if indicated, add PT    All questions were answered to the best of my ability and all concerns were addressed at this time.    Follow up in 4-6 weeks for above, or sooner if needed.    This note is dictated using the M*Modal Fluency Direct word recognition program. There are word recognition mistakes that are occasionally missed on review.

## 2024-10-02 NOTE — PROGRESS NOTES
One month S/P nasal reconstruction with bilateral auricular cartilage Batten grafts, revision septo,turbs.  Doing well with improved nasal airflow and good valve support.  Some intranasal crusting present but no evidence of infection.  A few retained ear sutures removed.  Reviewed post op instructions including increasing nasal saline sprays.  RTC two months or p.r.n. sooner.

## 2024-10-03 ENCOUNTER — OFFICE VISIT (OUTPATIENT)
Dept: SPORTS MEDICINE | Facility: CLINIC | Age: 39
End: 2024-10-03
Payer: COMMERCIAL

## 2024-10-03 VITALS
BODY MASS INDEX: 31.06 KG/M2 | WEIGHT: 216.94 LBS | DIASTOLIC BLOOD PRESSURE: 83 MMHG | SYSTOLIC BLOOD PRESSURE: 122 MMHG | HEIGHT: 70 IN

## 2024-10-03 DIAGNOSIS — M99.05 SOMATIC DYSFUNCTION OF PELVIS REGION: ICD-10-CM

## 2024-10-03 DIAGNOSIS — M79.10 MYALGIA: ICD-10-CM

## 2024-10-03 DIAGNOSIS — M99.04 SOMATIC DYSFUNCTION OF SACRAL REGION: ICD-10-CM

## 2024-10-03 DIAGNOSIS — M99.06 SOMATIC DYSFUNCTION OF LOWER EXTREMITY: ICD-10-CM

## 2024-10-03 DIAGNOSIS — M25.561 CHRONIC PAIN OF RIGHT KNEE: Primary | ICD-10-CM

## 2024-10-03 DIAGNOSIS — G89.29 CHRONIC PAIN OF RIGHT KNEE: Primary | ICD-10-CM

## 2024-10-03 DIAGNOSIS — M54.9 UPPER BACK PAIN: ICD-10-CM

## 2024-10-03 DIAGNOSIS — M99.02 SOMATIC DYSFUNCTION OF THORACIC REGION: ICD-10-CM

## 2024-10-03 DIAGNOSIS — M25.512 CHRONIC LEFT SHOULDER PAIN: ICD-10-CM

## 2024-10-03 DIAGNOSIS — M54.59 MECHANICAL LOW BACK PAIN: ICD-10-CM

## 2024-10-03 DIAGNOSIS — M99.08 SOMATIC DYSFUNCTION OF RIB CAGE REGION: ICD-10-CM

## 2024-10-03 DIAGNOSIS — G89.29 CHRONIC LEFT SHOULDER PAIN: ICD-10-CM

## 2024-10-03 DIAGNOSIS — M99.03 SOMATIC DYSFUNCTION OF LUMBAR REGION: ICD-10-CM

## 2024-10-03 DIAGNOSIS — M99.07 SOMATIC DYSFUNCTION OF UPPER EXTREMITY: ICD-10-CM

## 2024-10-03 PROCEDURE — 99214 OFFICE O/P EST MOD 30 MIN: CPT | Mod: 25,S$GLB,, | Performed by: ORTHOPAEDIC SURGERY

## 2024-10-03 PROCEDURE — 3079F DIAST BP 80-89 MM HG: CPT | Mod: CPTII,S$GLB,, | Performed by: ORTHOPAEDIC SURGERY

## 2024-10-03 PROCEDURE — 1160F RVW MEDS BY RX/DR IN RCRD: CPT | Mod: CPTII,S$GLB,, | Performed by: ORTHOPAEDIC SURGERY

## 2024-10-03 PROCEDURE — 1159F MED LIST DOCD IN RCRD: CPT | Mod: CPTII,S$GLB,, | Performed by: ORTHOPAEDIC SURGERY

## 2024-10-03 PROCEDURE — 3008F BODY MASS INDEX DOCD: CPT | Mod: CPTII,S$GLB,, | Performed by: ORTHOPAEDIC SURGERY

## 2024-10-03 PROCEDURE — 98928 OSTEOPATH MANJ 7-8 REGIONS: CPT | Mod: S$GLB,,, | Performed by: ORTHOPAEDIC SURGERY

## 2024-10-03 PROCEDURE — 99999 PR PBB SHADOW E&M-EST. PATIENT-LVL III: CPT | Mod: PBBFAC,,, | Performed by: ORTHOPAEDIC SURGERY

## 2024-10-03 PROCEDURE — 3074F SYST BP LT 130 MM HG: CPT | Mod: CPTII,S$GLB,, | Performed by: ORTHOPAEDIC SURGERY

## 2024-11-21 ENCOUNTER — OFFICE VISIT (OUTPATIENT)
Dept: SPORTS MEDICINE | Facility: CLINIC | Age: 39
End: 2024-11-21
Payer: COMMERCIAL

## 2024-11-21 VITALS
WEIGHT: 216.94 LBS | SYSTOLIC BLOOD PRESSURE: 127 MMHG | DIASTOLIC BLOOD PRESSURE: 86 MMHG | BODY MASS INDEX: 31.06 KG/M2 | HEIGHT: 70 IN

## 2024-11-21 DIAGNOSIS — M25.531 RIGHT WRIST PAIN: ICD-10-CM

## 2024-11-21 DIAGNOSIS — M99.02 SOMATIC DYSFUNCTION OF THORACIC REGION: ICD-10-CM

## 2024-11-21 DIAGNOSIS — G89.29 CHRONIC LEFT SHOULDER PAIN: ICD-10-CM

## 2024-11-21 DIAGNOSIS — M99.08 SOMATIC DYSFUNCTION OF RIB CAGE REGION: ICD-10-CM

## 2024-11-21 DIAGNOSIS — M79.10 MYALGIA: ICD-10-CM

## 2024-11-21 DIAGNOSIS — M99.04 SOMATIC DYSFUNCTION OF SACRAL REGION: ICD-10-CM

## 2024-11-21 DIAGNOSIS — M76.32 IT BAND SYNDROME, LEFT: ICD-10-CM

## 2024-11-21 DIAGNOSIS — M99.06 SOMATIC DYSFUNCTION OF LOWER EXTREMITY: ICD-10-CM

## 2024-11-21 DIAGNOSIS — M54.59 MECHANICAL LOW BACK PAIN: ICD-10-CM

## 2024-11-21 DIAGNOSIS — M25.561 CHRONIC PAIN OF RIGHT KNEE: Primary | ICD-10-CM

## 2024-11-21 DIAGNOSIS — M54.9 UPPER BACK PAIN: ICD-10-CM

## 2024-11-21 DIAGNOSIS — M99.03 SOMATIC DYSFUNCTION OF LUMBAR REGION: ICD-10-CM

## 2024-11-21 DIAGNOSIS — M99.05 SOMATIC DYSFUNCTION OF PELVIS REGION: ICD-10-CM

## 2024-11-21 DIAGNOSIS — M25.512 CHRONIC LEFT SHOULDER PAIN: ICD-10-CM

## 2024-11-21 DIAGNOSIS — M99.07 SOMATIC DYSFUNCTION OF UPPER EXTREMITY: ICD-10-CM

## 2024-11-21 DIAGNOSIS — G89.29 CHRONIC PAIN OF RIGHT KNEE: Primary | ICD-10-CM

## 2024-11-21 PROCEDURE — 99999 PR PBB SHADOW E&M-EST. PATIENT-LVL III: CPT | Mod: PBBFAC,,, | Performed by: ORTHOPAEDIC SURGERY

## 2024-11-21 NOTE — PROGRESS NOTES
CC: right knee, upper back, and low back pain     Willy is here today for follow up evaluation of his right knee, upper back, and lower back pain. Patient reports his pain is 2/10 today. He feels as though his upper back pain is feeling 25% improved and has been compliant with his HEP which has been helpful along with OMT. He feels as though his right knee pain has remained unchanged since his last visit. He recently appreciated left lateral knee pain with running and he did not feel comfortable enough continuing the run. His right wrist is bothering him as well which reminds him of when he was lifting his child and had inflammation around the wrist and thumb.    Recall from visit on 10/03/2024  Willy is here today for follow up evaluation of his right knee, upper back, and lower back pain. Patient reports his pain is 2/10 today. He did have sinus surgery shortly after his last visit and only recently did he start returning to some exercise activity.      PAST MEDICAL HISTORY:   Past Medical History:   Diagnosis Date    Migraine headache 2017    4-6 per year    Seasonal allergies Spring 2024    Sneezy. Runny/stuffy nowe    Sleep apnea 5/2013    Working on solving. To be discussed further.       PAST SURGICAL HISTORY:   Past Surgical History:   Procedure Laterality Date    APPENDECTOMY      NASAL SEPTOPLASTY      NASAL SEPTUM SURGERY  2014    SEPTOPLASTY, NOSE, WITH NASAL TURBINATE REDUCTION Bilateral 09/06/2024    Procedure: SEPTOPLASTY, NOSE, WITH NASAL TURBINATE REDUCTION;  Surgeon: Miguelito Delaney III, MD;  Location: [facility];  Service: Plastics;  Laterality: Bilateral;  Nasal Reconstruction; auricular cart [id number]    SINUS SURGERY  2014    UVULOPALATOPHARYNGOPLASTY         FAMILY HISTORY:   Family History   Problem Relation Name Age of Onset    No Known Problems Mother      Cancer Father Oleg         renal cell ca    Hypertension Father Oleg     Stroke Maternal Grandmother Veronika Lund     Cancer  Paternal Grandmother Natalie Branham         breast ca       SOCIAL HISTORY:   Social History     Socioeconomic History    Marital status:    Tobacco Use    Smoking status: Never    Smokeless tobacco: Never    Tobacco comments:     social cigars   Substance and Sexual Activity    Alcohol use: Yes     Alcohol/week: 7.0 standard drinks of alcohol     Types: 4 Glasses of wine, 3 Cans of beer per week    Drug use: No    Sexual activity: Yes     Partners: Female     Birth control/protection: Condom, Partner-Vasectomy, See Surgical Hx   Social History Narrative    Pt works in [employer] lives with wife and has 17 month old . No pets.      Social Drivers of Health     Financial Resource Strain: Low Risk  (7/8/2024)    Overall Financial Resource Strain (CARDIA)     Difficulty of Paying Living Expenses: Not hard at all   Food Insecurity: No Food Insecurity (7/8/2024)    Hunger Vital Sign     Worried About Running Out of Food in the Last Year: Never true     Ran Out of Food in the Last Year: Never true   Transportation Needs: No Transportation Needs (9/17/2023)    Received from [facility]    PRAPARE - Transportation     Lack of Transportation (Medical): No     Lack of Transportation (Non-Medical): No   Physical Activity: Sufficiently Active (7/8/2024)    Exercise Vital Sign     Days of Exercise per Week: 4 days     Minutes of Exercise per Session: 40 min   Stress: No Stress Concern Present (7/8/2024)    [employer] [place] [employer] - Occupational Stress Questionnaire     Feeling of Stress : Not at all   Housing Stability: Unknown (7/8/2024)    Housing Stability Vital Sign     Unable to Pay for Housing in the Last Year: No       MEDICATIONS:     Current Outpatient Medications:     sodium chloride (SALINE NASAL) 0.65 % nasal spray, 2 sprays by Nasal route every 4 (four) hours. Every 4 hours while awake (starting postop day 1) apply to bilateral nasal cavities, Disp: 44 mL, Rfl: 12    UBRELVY 100 mg  "tablet, TAKE 1 TABLET BY MOUTH AS NEEDED (MIGRAINE), Disp: , Rfl:     ondansetron (ZOFRAN) 4 MG tablet, Take 8 mg by mouth 2 (two) times daily as needed. (Patient not taking: Reported on 11/21/2024), Disp: , Rfl:     ondansetron (ZOFRAN-ODT) 4 MG TbDL, Dissolve 1 tablet (4 mg total) on the tongue every 6 (six) hours as needed (nausea/vomiting). (Patient not taking: Reported on 11/21/2024), Disp: 20 tablet, Rfl: 0    oxyCODONE-acetaminophen (PERCOCET) 5-325 mg per tablet, Take 1 tablet by mouth every 6 (six) hours as needed for Pain (refractory to over the counter pain medications). Note this medication contains tylenol/acetaminophen. Do not exceed >3000mg in 24hr period of tylenol., Disp: 20 tablet, Rfl: 0    ALLERGIES:   Review of patient's allergies indicates:  No Known Allergies     PHYSICAL EXAMINATION:  /86   Ht 5' 10" (1.778 m)   Wt 98.4 kg (216 lb 14.9 oz)   BMI 31.13 kg/m²   Vitals signs and nursing note have been reviewed.  General: In no acute distress, well developed, well nourished, no diaphoresis  Eyes: EOM full and smooth, no eye redness or discharge  HENT: normocephalic and atraumatic, neck supple, trachea midline, no nasal discharge, no external ear redness or discharge  Cardiovascular: 2+ and symmetric radial bilaterally, no LE edema  Lungs: respirations non-labored, no conversational dyspnea   Abd: non-distended, no rigidity  MSK: no amputation or deformity, no swelling of extremities  Neuro: AAOx3, CN2-12 grossly intact  Skin: No rashes, warm and dry  Psychiatric: cooperative, pleasant, mood and affect appropriate for age    SHOULDER: LEFT  The affected shoulder is compared to the contralateral shoulder.    Observation:    CERVICAL SPINE  Normal head carriage. + mild thoracic kyphosis.  Slight decrease in ROM of lateral rotation; otherwise, Full AROM in flexion, extension, sidebending.    SHOULDER  No ecchymosis, edema, or erythema throughout the shoulder girdle.  No sternal, clavicular, " or acromial deformities bilaterally.  No atrophy of the pectorals, deltoids, supraspinatus, infraspinatus, or biceps bilaterally.  No asymmetry of shoulders bilaterally.    ROM:  Active flexion to 180° on left and 180° on right.   Active abduction to 180° on left and 180° on right.    No scapular dyskinesia or winging.    Tenderness:  No tenderness at the SC or AC joint  No tenderness over the clavicle   No tenderness over biceps tendon in the bicipital groove  No tenderness over subacromial space  + tenderness over the shoulder girdle bilaterally, more so on the left    Strength Testing: (*pain)  Deltoid - 5/5 on left and 5/5 on right  Biceps - 5/5 on left and 5/5 on right  Triceps - 5/5 on left and 5/5 on right  Wrist extension - 5/5 on left and 5/5 on right  Wrist flexion - 5/5 on left and 5/5 on right   - 5/5 on left and 5/5 on right  Finger extension - 5/5 on left and 5/5 on right  Finger abduction - 5/5 on left and 5/5 on right    Neurovascular Exam:  2+ radial pulses BL  Sensation intact to light touch in the distal median, radial, and ulnar nerve distributions bilaterally.  Spurlings test - negative  Capillary refill intact <2 seconds in all digits bilaterally    MUSCULOSKELETAL EXAM:    BILATERAL KNEE EXAMINATION   Affected side is compared to contralateral knee     Observation:  No edema, erythema, ecchymosis, or effusion noted.  No muscle atrophy of the thighs and calves noted.  No obvious bony deformities noted.   No patella romulo or baja noted.  No genu valgus/varum noted. No recurvatum noted.    No tibial internal/external torsion.      Tenderness:  Patella - none    Lateral joint line - + left  Quad tendon - none   Medial joint line - none  Patellar tendon - none  Medial plica - none  Tibial tubercle - none   Lateral plica - none  Pes anserine - none   MCL prox - none  Distal ITB - + left   MCL distal - none  MFC - none    LCL prox - none  LFC - none    LCL distal - none  Tibia - none    Fibula -  none    No obvious bursae, plicae, popliteal cysts, or tendon derangement palpated.          ROM:   Active extension to 0° bilaterally without hyperextension, lag, crepitus, or patellar J sign.  Active flexion to 135° bilaterally.    Strength: (bilaterally)  Knee Flexion - 5/5  Knee Extension - 5/5  Hip Flexion - 5/5  Hip Extension - 5/5  Ankle dorsiflexion - 5/5  Ankle Plantarflexion - 5/5    Patellofemoral Exam:  Patella position - Neutral   Patellar ballottement - negative  Bulge sign - negative  Patellar grind - positive right  No patellar laxity with medial and lateral translation   No apprehension with medial and lateral patellar translation.     Meniscus Testing:     No pain with terminal extension and flexion.  Salinass test - negative   Thesaly test - negative    Ligament Testing:  Lachman's test - negative  No laxity with anterior drawer.  No laxity with posterior drawer.    No posterior sag sign.   No laxity with varus testing at 0 and 30 degrees.  No laxity with valgus testing at 0 and 30 degrees.    Neurovascular Examination:   Normal gait without antalgia.  Sensation intact to light touch in the obturator, lateral/intermediate/medial/posterior femoral cutaneous, saphenous, and common peroneal nerves bilaterally.  Pulses intact at the DP and PT arteries bilaterally.    Capillary refill intact <2 seconds in all toes bilaterally.    Lumbar Spine: bilateral lumbar region    Observation:    Normal cervicothoracolumbar curves.    No obvious pelvic obliquity while standing.    No edema, erythema, or ecchymosis noted in lumbosacral region.    No midline skin abnormalities.    No atrophy of lower limb musculature.  Leg length discrepancy with right longer than left through landmark visualization    Anterior rotation of right hip with elevated PSIS     Tenderness:  + tenderness over the right lumbar spine, iliolumbar region, posterior pelvis.  No tenderness over the sacrum, piriformis, greater/lesser  trochanters.  No bony deformities or step-offs palpated.     Range of Motion:  Active flexion to 60°.   Active extension to 25°.   Active rotation to 30° bilaterally.    Active sidebending to 25° bilaterally.  Passive hip flexion to 135° bilaterally.    Passive hip internal rotation to 45° bilaterally.    Passive hip external rotation to 45° bilaterally.    All ROM testing is without pain.    Strength Testing:  Hip flexion - 5/5  Hip extension - 5/5  Knee flexion - 5/5  Knee extension - 5/5  Dorsiflexion - 5/5  Plantarflexion - 5/5  Great toe extension - 5/5    Special Tests:  Seated straight leg raise - negative  NAVEEN test - negative  FADIR test - negative      Posture:  Upright and Anterior pelvic tilt with increased lumbar lordosis  Gait: Non-antalgic     TART (Tissue texture abnormality, Asymmetry,  Restriction of motion and/or Tenderness) changes:    Head:      Cervical Spine  Thoracic Spine  Lumbar Spine   C1  T1 Neutral L1 Neutral   C2  T2 ERSR L2 Neutral   C3  T3 ERSR L3 Neutral   C4  T4 Neutral L4 ERSR   C5  T5 Neutral L5 ERSR   C6  T6 Neutral     C7  T7 Neutral       T8 Neutral       T9 FRSL       T10 Neutral       T11 Neutral       T12 Neutral       Ribs:  External torsion rib 4 left    Upper Extremity:  Periscapular MFR bilaterally    Abdomen:    Pelvis:  Innominate:Left anterior rotation  Pubic bone:Left interior pubic shear    Sacrum:Right on Right sacral torsion    Lower Extremity:  MFR left ITB  TB distal ITB left      Key   F= Flexed   E = Extended   R = Rotated   N = Neutral   S = Sidebent   TTA = tissue texture abnormality   L/R/B (last letter) = left/right/bilateral   HTP = fascial herniated trigger point   TB = fascial trigger band   CD = fascial continuum distortion   MFR = myofascial restriction       Neurovascular Exam:  Sensation intact to light touch in the L2-S2 dermatomes bilaterally.   No pretibial edema or abnormal hair pattern of the shin.    Normal gait without  trendelenberg.      IMAGIN. X-ray obtained 2024 due to left shoulder pain   2. X-ray images were reviewed personally by me and then directly with patient.  3. FINDINGS: X-ray images obtained demonstrate no evidence of recent or healing fracture, lytic destructive process, or appreciable glenohumeral arthritic change observed. No glenohumeral dislocation. No abnormal soft tissue calcifications.   4. IMPRESSION: As above.     1. X-ray obtained 2024 due to right knee pain   2. X-ray images were reviewed personally by me and then directly with patient.  3. FINDINGS: X-ray images obtained demonstrate no significant degree of tibiofemoral or patellofemoral joint space narrowing is observed. Osseous structures demonstrate no evidence of recent or healing fracture, lytic destructive process, or osteochondral defect. No significant joint effusion is observed on either side.   4. IMPRESSION: As above.       ASSESSMENT:      ICD-10-CM ICD-9-CM   1. Chronic pain of right knee  M25.561 719.46    G89.29 338.29   2. It band syndrome, left  M76.32 728.89   3. Chronic left shoulder pain  M25.512 719.41    G89.29 338.29   4. Mechanical low back pain  M54.59 724.2   5. Upper back pain  M54.9 724.5   6. Myalgia  M79.10 729.1   7. Right wrist pain  M25.531 719.43   8. Somatic dysfunction of lumbar region  M99.03 739.3   9. Somatic dysfunction of pelvis region  M99.05 739.5   10. Somatic dysfunction of upper extremity  M99.07 739.7   11. Somatic dysfunction of lower extremity  M99.06 739.6   12. Somatic dysfunction of sacral region  M99.04 739.4   13. Somatic dysfunction of thoracic region  M99.02 739.2   14. Somatic dysfunction of rib cage region  M99.08 739.8           PLAN:  1-2. Knee pain/ITB - new issue  3. Chronic left shoulder pain - improved  4-6. Mechanical LBP/upper back pain/myalgia - improved  7. Right wrist pain - new issue    - Willy admits to chronic right knee pain and chronic lower and upper back pain.  He is feeling approximately 25% improved at this time which he attributes to OMT and compliance with his HEP.     - Symptoms, exam are still most consistent with mechanical lower and upper back pain and chronic right knee pain due to overcompensation, poor neuromuscular firing and an acquired leg length discrepancy.  We discussed the importance of decreasing inflammation and strengthening and stabilizing to help promote and maintain symptom improvement/resolution.  This is commonly accomplished with a short course of an anti-inflammatory and icing in addition to osteopathic manipulation, a home exercise program or physical therapy.     - Based on his description of pain/body language and somatic dysfunction identified on exam, I discussed osteopathic manipulation as a treatment option today. He consents to evaluation and treatment. See below.    - Continue with HEP for abdominal bracing, ant marches, diaphragmatic breathing, pelvic clocks 6-12, shoulder circles, snow angels, cat/cow, [place] exercises prescribed at initial visit.     - Physical therapy referral to [facility] placed today do work on body mechanics and encourage better muscle recruitment.     - Meloxicam 15 mg daily for 2 weeks followed by as needed.      8-14. Somatic dysfunction of lumbar, pelvis, sacral, thoracic, lower extremity, upper extremity, ribcage regions -     - OMT 7-8 regions. Oral consent obtained. Reviewed benefits and potential side effects. OMT indicated today due to signs and symptoms as well as local and remote somatic dysfunction findings and their related neurokinetic, lymphatic, fascial and/or arteriovenous body connections. OMT techniques used: Soft Tissue, Myofascial Release, Muscle Energy, High Velocity Low Amplitude, Fascial Distortion Model, and Articulatory. Treatment was tolerated well. Improvement noted in segmental mobility post-treatment in dysfunctional regions. There were no adverse events and no  complications immediately following treatment. Advised plenty of water to help alleviate soreness.      Future planning includes - possibly more OMT if helpful and if indicated, add PT    All questions were answered to the best of my ability and all concerns were addressed at this time.    Follow up in 6-8 weeks for above, or sooner if needed.      This note is dictated using the M*Modal Fluency Direct word recognition program. There are word recognition mistakes that are occasionally missed on review.

## 2024-11-22 RX ORDER — MELOXICAM 15 MG/1
15 TABLET ORAL DAILY
Qty: 30 TABLET | Refills: 0 | Status: SHIPPED | OUTPATIENT
Start: 2024-11-22

## 2024-11-27 ENCOUNTER — OFFICE VISIT (OUTPATIENT)
Dept: OTOLARYNGOLOGY | Facility: CLINIC | Age: 39
End: 2024-11-27
Payer: COMMERCIAL

## 2024-11-27 VITALS
WEIGHT: 224.63 LBS | BODY MASS INDEX: 32.23 KG/M2 | SYSTOLIC BLOOD PRESSURE: 114 MMHG | HEART RATE: 71 BPM | DIASTOLIC BLOOD PRESSURE: 81 MMHG

## 2024-11-27 DIAGNOSIS — Z98.890 POST-OPERATIVE STATE: ICD-10-CM

## 2024-11-27 DIAGNOSIS — J34.2 NASAL SEPTAL DEVIATION: ICD-10-CM

## 2024-11-27 DIAGNOSIS — J34.829 NASAL VALVE COLLAPSE: Primary | ICD-10-CM

## 2024-11-27 PROCEDURE — 1160F RVW MEDS BY RX/DR IN RCRD: CPT | Mod: CPTII,S$GLB,, | Performed by: OTOLARYNGOLOGY

## 2024-11-27 PROCEDURE — 1159F MED LIST DOCD IN RCRD: CPT | Mod: CPTII,S$GLB,, | Performed by: OTOLARYNGOLOGY

## 2024-11-27 PROCEDURE — 99024 POSTOP FOLLOW-UP VISIT: CPT | Mod: S$GLB,,, | Performed by: OTOLARYNGOLOGY

## 2024-11-27 PROCEDURE — 3079F DIAST BP 80-89 MM HG: CPT | Mod: CPTII,S$GLB,, | Performed by: OTOLARYNGOLOGY

## 2024-11-27 PROCEDURE — 3074F SYST BP LT 130 MM HG: CPT | Mod: CPTII,S$GLB,, | Performed by: OTOLARYNGOLOGY

## 2024-11-27 PROCEDURE — 99999 PR PBB SHADOW E&M-EST. PATIENT-LVL III: CPT | Mod: PBBFAC,,, | Performed by: OTOLARYNGOLOGY

## 2024-11-27 NOTE — PROGRESS NOTES
Three months S/P nasal reconstruction with bilateral auricular cartilage Batten grafts, revision septo,turbs.  Doing well with improved nasal airflow and good valve support, septum straight.  He does have the sensation of breathing in well through his nose however he feels a valvelike closing when breathing outward.  Reviewed post op instructions including continuing nasal saline sprays.  RTC in 3 weeks with his wife at her visit for possible scope examination.

## 2024-12-02 PROBLEM — M25.512 CHRONIC LEFT SHOULDER PAIN: Status: ACTIVE | Noted: 2024-12-02

## 2024-12-02 PROBLEM — G89.29 CHRONIC LEFT SHOULDER PAIN: Status: ACTIVE | Noted: 2024-12-02

## 2024-12-02 PROBLEM — R52 PAIN AGGRAVATED BY LIFTING: Status: ACTIVE | Noted: 2024-12-02

## 2024-12-02 PROBLEM — G89.29 CHRONIC PAIN OF RIGHT KNEE: Status: ACTIVE | Noted: 2024-12-02

## 2024-12-02 PROBLEM — M25.561 CHRONIC PAIN OF RIGHT KNEE: Status: ACTIVE | Noted: 2024-12-02

## 2024-12-02 PROBLEM — M54.59 MECHANICAL LOW BACK PAIN: Status: ACTIVE | Noted: 2024-12-02

## 2024-12-03 ENCOUNTER — CLINICAL SUPPORT (OUTPATIENT)
Dept: REHABILITATION | Facility: OTHER | Age: 39
End: 2024-12-03
Payer: COMMERCIAL

## 2024-12-03 DIAGNOSIS — G89.29 CHRONIC LEFT SHOULDER PAIN: ICD-10-CM

## 2024-12-03 DIAGNOSIS — M25.561 CHRONIC PAIN OF RIGHT KNEE: ICD-10-CM

## 2024-12-03 DIAGNOSIS — R52 PAIN AGGRAVATED BY LIFTING: ICD-10-CM

## 2024-12-03 DIAGNOSIS — G89.29 CHRONIC PAIN OF RIGHT KNEE: ICD-10-CM

## 2024-12-03 DIAGNOSIS — M25.512 CHRONIC LEFT SHOULDER PAIN: ICD-10-CM

## 2024-12-03 DIAGNOSIS — M54.59 MECHANICAL LOW BACK PAIN: Primary | ICD-10-CM

## 2024-12-03 PROCEDURE — 97530 THERAPEUTIC ACTIVITIES: CPT | Mod: PN

## 2024-12-03 PROCEDURE — 97161 PT EVAL LOW COMPLEX 20 MIN: CPT | Mod: PN

## 2024-12-03 PROCEDURE — 97140 MANUAL THERAPY 1/> REGIONS: CPT | Mod: PN

## 2024-12-03 NOTE — PLAN OF CARE
[facility]  Physical Therapy Initial Evaluation    Name: Willy Branham  Clinic Number: [id number]    Therapy Diagnosis:   Encounter Diagnoses   Name Primary?    Mechanical low back pain Yes    Chronic pain of right knee     Chronic left shoulder pain     Pain aggravated by lifting      Physician: Paulie Morrison,     Physician Orders: PT Eval and Treat   Medical Diagnosis from Referral: Chronic pain of right knee [M25.561, G89.29], Chronic left shoulder pain [M25.512, G89.29], Mechanical low back pain [M54.59]   Evaluation Date: 12/3/2024  Authorization Period Expiration: 12/31/2024  Plan of Care Expiration: 1/31/2025  Visit # / Visits authorized: 1/ 1; future visits pending  FOTO 1st follow up:  FOTO 2nd follow up:    Time In: 4:00 pm  Time Out: 4:55 pm  Total Billable Time: 55 minutes    Precautions: Standard    Subjective   Date of onset: Chronic   History of current condition - Willy is a 39 year old male that reports a chief complaint of Right knee, upper, and lower back pain that is chronic in nature. Patient states he was seen by Dr. Morrison and responded well to OMT and saw 25% improvement. Patient states that his Right knee pain is about the same, and began having some Left lateral knee pain when running. He states that he also felt some wrist/thumb pain when lifting his child. Patient endorses that he is typically running 3.5 miles 2x/week and does pilates 1x/week. Patient states that he has had lower back pain on and off since his 20's. 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 Patient states that he also has episodes of Right sided neck pain and migraines.        Past Medical History:   Diagnosis Date    Migraine headache 2017    4-6 per year    Seasonal allergies Spring 2024    Sneezy. Runny/stuffy nowe    Sleep apnea 5/2013    Working on solving. To be discussed further.     Willy Branham  has a past surgical history that includes Appendectomy; Nasal  septoplasty; Uvulopalatopharyngoplasty; septoplasty, nose, with nasal turbinate reduction (Bilateral, 09/06/2024); Nasal septum surgery (2014); and Sinus surgery (2014).    Willy has a current medication list which includes the following prescription(s): meloxicam, ondansetron, ondansetron, oxycodone-acetaminophen, saline nasal, and ubrelvy.    Review of patient's allergies indicates:  No Known Allergies     Imaging:   FINDINGS:  No significant degree of tibiofemoral or patellofemoral joint space narrowing is observed. Osseous structures demonstrate no evidence of recent or healing fracture, lytic destructive process, or osteochondral defect. No significant joint effusion is observed on either side.  Impression:  No significant abnormality.:     FINDINGS:  Visualized osseous structures appear unremarkable, with no evidence of recent or healing fracture, lytic destructive process, or appreciable glenohumeral arthritic change observed.  No glenohumeral dislocation.  No abnormal soft tissue calcifications.  Impression:  No significant abnormality.    Prior Therapy: None, OMT with Dr. Morrison   Social History: Lives in [place], [place] with family   Occupation: [employer]  Prior Level of Function: Independent with all ADL/iADLs   Current Level of Function: Independent with all ADL/iADLs     Pain:  Current 2/10, worst 4/10, best 2/10   Location: right, left lateral knee and bilateral low back, upper back  Description: Aching, Dull, Tight, Deep, and Sharp  Aggravating Factors: Standing, Walking, Night Time, Morning, Extension, Flexing, Lifting, and running  Easing Factors: lying down, rest, and activity modification    Pts goals: improve his overall physical fitness, improve his mobility in low back/neck, and continue running 3 miles 2x/week    Objective     Observation: patient with decreased glute mass and Left quadriceps atrophy    Posture:  Right shoulder downward rotation, anterior glide, lumbar hinge points at  L2/L3, slight increase in kyphosis    Gait: decreased thoracic rotation, increased lumbar rotation, decreased right arm swing, Left tibial external rotation and genu varum posture     Functional Movements:   Functional Squat: 10 reps, tibial external rotation on the Left, decreased dorsiflexion bilateral    Single Leg Squat (L3/L4):   - Right 3 reps, adduction/IR  - Left 3 reps, adduction/IR     Heel Walks (L4/L5): 20 yards     Single Leg Heel Raise  Right 16 reps   Left 18 reps      Single Leg Stance:   - Right 30 seconds   Quality: hip adduction and pelvic drop  - Left 30 seconds   Quality:  hip adduction and pelvic drop      Thoraco-Lumbar Range of Motion:    Limitations (%) Pain Observation   Flexion 5%   No  Hip hinge dominant   Extension 15%   YES   Left L4   Left Side Bending 10% YES   Left L4   Right Side Bending 10% NO     Left Rotation 10% NO     Right Rotation 10% YES          Lower Extremity Strength  Right LE  Left LE    Quadriceps: 76.9# Quadriceps: 70#   Hamstrings: 33.7# Hamstrings: 48.1#   Iliospoas: 4/5 Iliospoas: 4/5   Hip extension:  4-/5 Hip extension: 4-/5   PGM: 45.6# PGM: 49.3#   Hip ER:  3+/5 Hip ER: 4/5   Hip IR: 4/5 Hip IR: 4/5     Special Tests:   Observation/Result   Repeated Flexion -   Repeated Extension -   Sacral Rocking + L3/4   Lumbar Compression Test + L2/3   Lumbar Protective Mechanism + delayed   Prone LE Extension + poor timing     Sacroiliac Joint Screen  SIJ  Right  Left    Thigh Thrust - -   Distraction - -   Sacral Thrust - -   SIJ Mobility     Flick Test - -   Standing Flexion - -     Hip Screen  HIP Right  Left    ROC - -   NAVEEN - -   Hip Scour - -     Hip Abduction Test:   - Right 1/3  - Left 1/3     Positive finding: inability to control pelvis in the frontal plain.    0 = no signs of poor movement control  1 = minimal loss of control as noted through signs of wobble  2 = moderate loss of control with at least 2 deviations  3 = severe loss of control with at least 3  "deviations and requires hand on mat to stabilize    Movement System Impairment Screen:   MSI Observation    Bent Knee Fall Out +   Prone Knee Bend + hyperextension   Alternating March -   Hand Heel Rock Relief and stretch       Neural Tension Testing:   Slump: (+) relevant  SLR: -    Mobility Testing:  -Shear testing:   (+) L2/L3  -Segmental mobility testing:  Limited L3/4      Palpation:   -Erector Spinae: Tonic  -Multifidi activation: poor    Flexibility: +   Ely's test: R = - ; L = +   Hamstrings: R = - ; L = +   Wilman test: R = - ; L = -      Intake Outcome Measure for FOTO Lumbar Survey    Therapist reviewed FOTO scores for Willy Branham on 12/3/2024.   FOTO documents entered into [employer] - see Media section.    Intake Score: 40%  Predicted Score: 29%    Primary Measure Score Range Intake Score Score Interpretation  DASH 100 - 0 23.3 Higher Score = Greater Disability    Knee Intake 41%  Knee Predicted 31%  Primary Measure Score Range Intake Score Score Interpretation  KOOS, JR. 0 - 100 66.0 Lower Score = Greater Disability       TREATMENT   Treatment Time In: 4:30 pm  Treatment Time Out: 4:50 pm  Total Treatment time separate from Evaluation: 20 minutes    Willy participated in dynamic functional therapeutic activities to improve functional performance for 10  minutes, including:  Access Code: [email or web address]  URL: [email or web address]  Date: 12/03/2024  Prepared by: Valdez Jain    Exercises  - Sidelying Hip External Rotation on Table  - 3-4 x daily - 5-7 x weekly - 3 sets - 10 reps - 3" hold  - Quadruped Rocking Backward  - 1-2 x daily - 5-7 x weekly - 3 sets - 10 reps - 3-5 seconds hold  - Standing Hip Abduction with Bent Knee  - 3-4 x daily - 5-7 x weekly - 3 sets - 10 reps - 3" hold  - Overhead Shoulder Shrugs at Wall  - 3-4 x daily - 5-7 x weekly - 2 sets - 10 reps - 3" hold    Willy received the following manual therapy techniques: Joint mobilizations were applied to the: Thoracic, Lumbar, Knee for " 10 minutes, including:  After explaining to the patient the effects HVLAT on joint mobility and muscular tension, and the precautions, patient was placed in supine with associated joint safely locked out and a gentle thrust was imparted to Right C5/6 with notable cavitation.  Noted that patient improvement in c-spine AROM and Right neck pain  Left L3/4 HVLA  Supine Thoracic HVLA  Bilateral Hip HVLA    Home Exercises and Patient Education Provided    Education provided re: prognosis, activity modification, goals for therapy, role of therapy for care, exercises/HEP    Written Home Exercises Provided: Yes.  Exercises were reviewed and Willy was able to demonstrate them prior to the end of the session.   Pt received a written copy of exercises to perform at home. Willy demonstrated good understanding of the education provided.     See EMR under patient instructions for exercises given.   Assessment   Willy is a 39 y.o. male referred to outpatient Physical Therapy with a medical diagnosis of mechanical low back pain, Right knee and Left shoulder pain. Pt presents with signs and symptoms consistent with Right cervical extension rotation syndrome with scapular downward rotation, lumbar extension syndrome, and hip adduction/IR syndrome bilaterally    Pt prognosis is Excellent.   Pt will benefit from skilled outpatient Physical Therapy to address the deficits stated above and in the chart below, provide pt/family education, and to maximize pt's level of independence.     Plan of care discussed with patient: Yes  Patient's spiritual, cultural and educational needs considered and patient is agreeable to the plan of care and goals as stated below:     Anticipated Barriers for therapy: None    Medical Necessity is demonstrated by the following  History  Co-morbidities and personal factors that may impact the plan of care [x] LOW: no personal factors / co-morbidities  [] MODERATE: 1-2 personal factors / co-morbidities  []  HIGH: 3+ personal factors / co-morbidities    Moderate / High Support Documentation:      Examination  Body Structures and Functions, activity limitations and participation restrictions that may impact the plan of care [x] LOW: addressing 1-2 elements  [] MODERATE: 3+ elements  [] HIGH: 4+ elements (please support below)    Moderate / High Support Documentation:      Clinical Presentation [x] LOW: stable  [] MODERATE: Evolving  [] HIGH: Unstable     Decision Making/ Complexity Score: low       Goals:  Short Term Goals:  4 weeks  1.Report decreased low back pain  < / =  2/10  to increase tolerance for running.  2. Increase ROM by 10 degrees where limited in order to perform ADLs without difficulty.  3. Increase strength by 1/3 MMT grade in deep ER  to increase tolerance for ADL and work activities.  4. Pt to tolerate HEP to improve ROM and independence with ADL's    Long Term Goals: 8 weeks  1.Report decreased low back pain < / = 0/10  to increase tolerance for tolerance for running 3.5 miles.  2.Patient goal: run 3 miles 2x/week and pilates 1x/week.  3.Increase strength to 4+/5 in deep ER and hip abductors  to increase tolerance for ADL and work activities.  4. Pt will report at 31% score on FOTO  to demonstrate increase in LE function with every day tasks.   Plan   Plan of care Certification: 12/3/2024 to 1/31/2025.    Outpatient Physical Therapy 2 times weekly for 12 weeks to include the following interventions: Cervical/Lumbar Traction, Electrical Stimulation as needed, Gait Training, Manual Therapy, Moist Heat/ Ice, Neuromuscular Re-ed, Orthotic Management and Training, Patient Education, Self Care, Therapeutic Activities, and Therapeutic Exercise, Blood Flow Restriction Training, Trigger Point Dry Needling as needed.      Valdez Jain PT, DPT  Board Certified Orthopaedic Clinical Specialist     Plan Next Visit   Walking Lunges with Open Book  Bird Dogs Isometric   Single Leg Squat with Support   Paloff Hold with  FRAIRE  Side Plank with Hip Abd/ER  Manual Therapy  Hip/Talocrural/Lumbar/Thoracic

## 2024-12-04 NOTE — PATIENT INSTRUCTIONS
"Access Code: [email or web address]  URL: [email or web address]  Date: 12/03/2024  Prepared by: Valdez Jain    Exercises  - Sidelying Hip External Rotation on Table  - 3-4 x daily - 5-7 x weekly - 3 sets - 10 reps - 3" hold  - Quadruped Rocking Backward  - 1-2 x daily - 5-7 x weekly - 3 sets - 10 reps - 3-5 seconds hold  - Standing Hip Abduction with Bent Knee  - 3-4 x daily - 5-7 x weekly - 3 sets - 10 reps - 3" hold  - Overhead Shoulder Shrugs at Wall  - 3-4 x daily - 5-7 x weekly - 2 sets - 10 reps - 3" hold  "

## 2024-12-09 NOTE — PROGRESS NOTES
[facility]   Physical Therapy Treatment Note      Name: Willy Branham  [facility] Number: [id number]    Therapy Diagnosis:   Encounter Diagnoses   Name Primary?    Mechanical low back pain Yes    Chronic pain of right knee     Chronic left shoulder pain     Pain aggravated by lifting      Physician: Paulie Morrison DO    Visit Date: 12/10/2024    Physician Orders: PT Eval and Treat   Medical Diagnosis from Referral: Chronic pain of right knee [M25.561, G89.29], Chronic left shoulder pain [M25.512, G89.29], Mechanical low back pain [M54.59]   Evaluation Date: 12/3/2024  Authorization Period Expiration: 12/31/2024  Plan of Care Expiration: 1/31/2025  Visit # / Visits authorized: 1/ 1; future visits pending  FOTO 1st follow up:  FOTO 2nd follow up:    PTA Visit #: 0/5     Precautions: Standard    Time In: 2:51 pm  Time Out: 3:50 pm  Total Appointment Time: 59 minutes    Subjective     Patient reports: that he was able to do exercises without pain or increase in symptoms. He tried to run and wasn't able to due to Left knee pain.  He was compliant with home exercise program.  Response to previous treatment: first follow up  Functional change: ongoing    Pain:  Current 2/10, worst 4/10, best 2/10   Location: right, left lateral knee and bilateral low back, upper back    Objective    Asterisk Signs:   Observation: patient with decreased glute mass and Left quadriceps atrophy     Posture:  Right shoulder downward rotation, anterior glide, lumbar hinge points at L2/L3, slight increase in kyphosis     Gait: decreased thoracic rotation, increased lumbar rotation, decreased right arm swing, Left tibial external rotation and genu varum posture      Functional Movements:   Functional Squat: 10 reps, tibial external rotation on the Left, decreased dorsiflexion bilateral     Single Leg Squat (L3/L4):   - Right 3 reps, adduction/IR  - Left 3 reps, adduction/IR      Heel Walks (L4/L5): 20 yards     Single  Leg Heel Raise  Right 16 reps   Left 18 reps       Single Leg Stance:   - Right 30 seconds   Quality: hip adduction and pelvic drop  - Left 30 seconds   Quality:  hip adduction and pelvic drop        Thoraco-Lumbar Range of Motion:     Limitations (%) Pain Observation   Flexion 5%    No  Hip hinge dominant   Extension 15%    YES   Left L4   Left Side Bending 10% YES   Left L4   Right Side Bending 10% NO      Left Rotation 10% NO      Right Rotation 10% YES             Lower Extremity Strength  Right LE   Left LE     Quadriceps: 76.9# Quadriceps: 70#   Hamstrings: 33.7# Hamstrings: 48.1#   Iliospoas: 4/5 Iliospoas: 4/5   Hip extension:  4-/5 Hip extension: 4-/5   PGM: 45.6# PGM: 49.3#   Hip ER:  3+/5 Hip ER: 4/5   Hip IR: 4/5 Hip IR: 4/5      Special Tests:    Observation/Result   Repeated Flexion -   Repeated Extension -   Sacral Rocking + L3/4   Lumbar Compression Test + L2/3   Lumbar Protective Mechanism + delayed   Prone LE Extension + poor timing      Sacroiliac Joint Screen  SIJ  Right  Left    Thigh Thrust - -   Distraction - -   Sacral Thrust - -   SIJ Mobility       Flick Test - -   Standing Flexion - -      Hip Screen  HIP Right  Left    ROC - -   NAVEEN - -   Hip Scour - -      Hip Abduction Test:   - Right 1/3  - Left 1/3      Positive finding: inability to control pelvis in the frontal plain.    0 = no signs of poor movement control  1 = minimal loss of control as noted through signs of wobble  2 = moderate loss of control with at least 2 deviations  3 = severe loss of control with at least 3 deviations and requires hand on mat to stabilize     Movement System Impairment Screen:   MSI Observation    Bent Knee Fall Out +   Prone Knee Bend + hyperextension   Alternating March -   Hand Heel Rock Relief and stretch       Neural Tension Testing:   Slump: (+) relevant  SLR: -     Mobility Testing:  -Shear testing:   (+) L2/L3  -Segmental mobility testing:  Limited L3/4        Palpation:   -Erector Spinae:  "Tonic  -Multifidi activation: poor     Flexibility: +              Ely's test: R = - ; L = +              Hamstrings: R = - ; L = +              Wilman test: R = - ; L = -    Objective Measures updated at progress report unless specified.     Treatment     Willy received the treatments listed below:      manual therapy techniques: Joint mobilizations were applied to the: Spine/Hip for 10 minutes, including:  After explaining to the patient the effects HVLAT on joint mobility and muscular tension, and the precautions, patient was placed in supine with associated joint safely locked out and a gentle thrust was imparted to Right C5/6 with notable cavitation.  Noted that patient improvement in c-spine AROM and Right neck pain  Left L3/4 HVLA  Supine Thoracic HVLA  Bilateral Hip HVLA  Left Knee gapping grade IV    neuromuscular re-education activities to improve: Balance, Coordination, Kinesthetic, Sense, Proprioception, and Posture for 23 minutes. The following activities were included:  - Sidelying Hip External Rotation 25 reps  - Quadruped Rocking Backward 20x's   - Standing Hip Abduction with Bent Knee 30x's    - Overhead Shoulder Shrugs at Wall 20x's  - Knee Extension Isometrics 5 x 30" hold     therapeutic activities to improve functional performance for 23  minutes, including:  - Wall Lunges 3 x 8 reps   - Leg Lowering 3 x 5 reps   - Single Leg Squats 3 x 3 reps   - Plank with Hip Extension 2 x 10 reps       Patient Education and Home Exercises       Education provided:   - Patient was provided education on for continued compliance with HEP for continued functional mobility and strength gains for return to prior level of function.      Written Home Exercises Provided: Patient instructed to cont prior HEP. Exercises were reviewed and Willy was able to demonstrate them prior to the end of the session.  Willy demonstrated good  understanding of the education provided. See Electronic Medical Record under Patient " Instructions for exercises provided during therapy sessions    Assessment   Patient presents to the clinic with moderate symptom irritability pre-session and mild irritability post-session. Manual therapy was performed to Left knee, hip, spine to improve joint play and allow for optimal movement during corrective exercises.   Patient demonstrates Left hip adduction with IR syndrome leading to increased pain in the medial knee and PFJ.   Patient demonstrated improvements in quadriceps motor control within session.     Patient will continue to benefit from skilled outpatient physical therapy to address the deficits listed in the problem list box on initial evaluation, provide patient/family education and to maximize patient's level of independence in the home and community environment.     Willy Is progressing well towards his goals.   Patient prognosis is Good.     Patient's spiritual, cultural and educational needs considered and pt agreeable to plan of care and goals.     Anticipated barriers to physical therapy: None    Goals:   Short Term Goals:  4 weeks  1.Report decreased low back pain  < / =  2/10  to increase tolerance for running.  2. Increase ROM by 10 degrees where limited in order to perform ADLs without difficulty.  3. Increase strength by 1/3 MMT grade in deep ER  to increase tolerance for ADL and work activities.  4. Pt to tolerate HEP to improve ROM and independence with ADL's     Long Term Goals: 8 weeks  1.Report decreased low back pain < / = 0/10  to increase tolerance for tolerance for running 3.5 miles.  2.Patient goal: run 3 miles 2x/week and pilates 1x/week.  3.Increase strength to 4+/5 in deep ER and hip abductors  to increase tolerance for ADL and work activities.  4. Pt will report at 31% score on FOTO  to demonstrate increase in LE function with every day tasks.   Plan   Plan of care Certification: 12/3/2024 to 1/31/2025.     Outpatient Physical Therapy 2 times weekly for 12 weeks to  include the following interventions: Cervical/Lumbar Traction, Electrical Stimulation as needed, Gait Training, Manual Therapy, Moist Heat/ Ice, Neuromuscular Re-ed, Orthotic Management and Training, Patient Education, Self Care, Therapeutic Activities, and Therapeutic Exercise, Blood Flow Restriction Training, Trigger Point Dry Needling as needed.    Valdez Jain PT, DPT  Board Certified Orthopaedic Clinical Specialist

## 2024-12-10 ENCOUNTER — CLINICAL SUPPORT (OUTPATIENT)
Dept: REHABILITATION | Facility: OTHER | Age: 39
End: 2024-12-10
Payer: COMMERCIAL

## 2024-12-10 DIAGNOSIS — G89.29 CHRONIC LEFT SHOULDER PAIN: ICD-10-CM

## 2024-12-10 DIAGNOSIS — G89.29 CHRONIC PAIN OF RIGHT KNEE: ICD-10-CM

## 2024-12-10 DIAGNOSIS — M25.512 CHRONIC LEFT SHOULDER PAIN: ICD-10-CM

## 2024-12-10 DIAGNOSIS — M25.561 CHRONIC PAIN OF RIGHT KNEE: ICD-10-CM

## 2024-12-10 DIAGNOSIS — R52 PAIN AGGRAVATED BY LIFTING: ICD-10-CM

## 2024-12-10 DIAGNOSIS — M54.59 MECHANICAL LOW BACK PAIN: Primary | ICD-10-CM

## 2024-12-10 PROCEDURE — 97140 MANUAL THERAPY 1/> REGIONS: CPT | Mod: PN

## 2024-12-10 PROCEDURE — 97112 NEUROMUSCULAR REEDUCATION: CPT | Mod: PN

## 2024-12-10 PROCEDURE — 97530 THERAPEUTIC ACTIVITIES: CPT | Mod: PN

## 2024-12-17 ENCOUNTER — PATIENT MESSAGE (OUTPATIENT)
Dept: REHABILITATION | Facility: OTHER | Age: 39
End: 2024-12-17

## 2024-12-17 ENCOUNTER — CLINICAL SUPPORT (OUTPATIENT)
Dept: REHABILITATION | Facility: OTHER | Age: 39
End: 2024-12-17
Payer: COMMERCIAL

## 2024-12-17 DIAGNOSIS — M25.561 CHRONIC PAIN OF RIGHT KNEE: ICD-10-CM

## 2024-12-17 DIAGNOSIS — M54.59 MECHANICAL LOW BACK PAIN: Primary | ICD-10-CM

## 2024-12-17 DIAGNOSIS — R52 PAIN AGGRAVATED BY LIFTING: ICD-10-CM

## 2024-12-17 DIAGNOSIS — M25.512 CHRONIC LEFT SHOULDER PAIN: ICD-10-CM

## 2024-12-17 DIAGNOSIS — G89.29 CHRONIC PAIN OF RIGHT KNEE: ICD-10-CM

## 2024-12-17 DIAGNOSIS — G89.29 CHRONIC LEFT SHOULDER PAIN: ICD-10-CM

## 2024-12-17 PROCEDURE — 97530 THERAPEUTIC ACTIVITIES: CPT | Mod: PN

## 2024-12-17 PROCEDURE — 97112 NEUROMUSCULAR REEDUCATION: CPT | Mod: PN

## 2024-12-17 PROCEDURE — 97140 MANUAL THERAPY 1/> REGIONS: CPT | Mod: PN

## 2024-12-17 NOTE — PROGRESS NOTES
[facility]   Physical Therapy Treatment Note      Name: Willy Branham  [facility] Number: [id number]    Therapy Diagnosis:   Encounter Diagnoses   Name Primary?    Mechanical low back pain Yes    Chronic pain of right knee     Chronic left shoulder pain     Pain aggravated by lifting        Physician: Paulie Morrison DO    Visit Date: 12/17/2024    Physician Orders: PT Eval and Treat   Medical Diagnosis from Referral: Chronic pain of right knee [M25.561, G89.29], Chronic left shoulder pain [M25.512, G89.29], Mechanical low back pain [M54.59]   Evaluation Date: 12/3/2024  Authorization Period Expiration: 12/31/2024  Plan of Care Expiration: 1/31/2025  Visit # / Visits authorized: 2/5  FOTO 1st follow up:  FOTO 2nd follow up:    PTA Visit #: 0/5     Precautions: Standard    Time In: 8:30 am  Time Out: 9:30 am  Total Appointment Time: 59 minutes    Subjective     Patient reports: that he still felt a little pain in the knee.   He was compliant with home exercise program.  Response to previous treatment: improved mobility   Functional change: ongoing    Pain:  Current 2/10, worst 4/10, best 2/10   Location: right, left lateral knee and bilateral low back, upper back    Objective    Asterisk Signs:   Observation: patient with decreased glute mass and Left quadriceps atrophy     Posture:  Right shoulder downward rotation, anterior glide, lumbar hinge points at L2/L3, slight increase in kyphosis     Gait: decreased thoracic rotation, increased lumbar rotation, decreased right arm swing, Left tibial external rotation and genu varum posture      Functional Movements:   Functional Squat: 10 reps, tibial external rotation on the Left, decreased dorsiflexion bilateral     Single Leg Squat (L3/L4):   - Right 3 reps, adduction/IR  - Left 3 reps, adduction/IR      Heel Walks (L4/L5): 20 yards     Single Leg Heel Raise  Right 16 reps   Left 18 reps       Single Leg Stance:   - Right 30 seconds   Quality:  hip adduction and pelvic drop  - Left 30 seconds   Quality:  hip adduction and pelvic drop        Thoraco-Lumbar Range of Motion:     Limitations (%) Pain Observation   Flexion 5%    No  Hip hinge dominant   Extension 15%    YES   Left L4   Left Side Bending 10% YES   Left L4   Right Side Bending 10% NO      Left Rotation 10% NO      Right Rotation 10% YES             Lower Extremity Strength  Right LE   Left LE     Quadriceps: 76.9# Quadriceps: 70#   Hamstrings: 33.7# Hamstrings: 48.1#   Iliospoas: 4/5 Iliospoas: 4/5   Hip extension:  4-/5 Hip extension: 4-/5   PGM: 45.6# PGM: 49.3#   Hip ER:  3+/5 Hip ER: 4/5   Hip IR: 4/5 Hip IR: 4/5      Special Tests:    Observation/Result   Repeated Flexion -   Repeated Extension -   Sacral Rocking + L3/4   Lumbar Compression Test + L2/3   Lumbar Protective Mechanism + delayed   Prone LE Extension + poor timing      Sacroiliac Joint Screen  SIJ  Right  Left    Thigh Thrust - -   Distraction - -   Sacral Thrust - -   SIJ Mobility       Flick Test - -   Standing Flexion - -      Hip Screen  HIP Right  Left    ROC - -   NAVEEN - -   Hip Scour - -      Hip Abduction Test:   - Right 1/3  - Left 1/3      Positive finding: inability to control pelvis in the frontal plain.    0 = no signs of poor movement control  1 = minimal loss of control as noted through signs of wobble  2 = moderate loss of control with at least 2 deviations  3 = severe loss of control with at least 3 deviations and requires hand on mat to stabilize     Movement System Impairment Screen:   MSI Observation    Bent Knee Fall Out +   Prone Knee Bend + hyperextension   Alternating March -   Hand Heel Rock Relief and stretch       Neural Tension Testing:   Slump: (+) relevant  SLR: -     Mobility Testing:  -Shear testing:   (+) L2/L3  -Segmental mobility testing:  Limited L3/4        Palpation:   -Erector Spinae: Tonic  -Multifidi activation: poor     Flexibility: +              Ely's test: R = - ; L = +               "Hamstrings: R = - ; L = +              Wilman test: R = - ; L = -    Objective Measures updated at progress report unless specified.     Treatment     Willy received the treatments listed below:      manual therapy techniques: Joint mobilizations were applied to the: Spine/Hip for 10 minutes, including:  After explaining to the patient the effects HVLAT on joint mobility and muscular tension, and the precautions, patient was placed in supine with associated joint safely locked out and a gentle thrust was imparted to Right C5/6 with notable cavitation.  Noted that patient improvement in c-spine AROM and Right neck pain  Left L3/4 HVLA  Supine Thoracic HVLA  Bilateral Hip HVLA  Left Knee gapping grade IV    neuromuscular re-education activities to improve: Balance, Coordination, Kinesthetic, Sense, Proprioception, and Posture for 23 minutes. The following activities were included:  - Sidelying Hip External Rotation 25 reps  - Quadruped Rocking Backward 20x's   - Standing Hip Abduction with Bent Knee 30x's    - Overhead Shoulder Shrugs at Wall 20x's  - Knee Extension Isometrics 5 x 30" hold     therapeutic activities to improve functional performance for 23  minutes, including:  - Wall Lunges 3 x 8 reps   - Leg Lowering 3 x 5 reps   - Single Leg Squats 3 x 3 reps   - Plank with Hip Extension 2 x 10 reps       Patient Education and Home Exercises       Education provided:   - Patient was provided education on for continued compliance with HEP for continued functional mobility and strength gains for return to prior level of function.      Written Home Exercises Provided: Patient instructed to cont prior HEP. Exercises were reviewed and Willy was able to demonstrate them prior to the end of the session.  Willy demonstrated good  understanding of the education provided. See Electronic Medical Record under Patient Instructions for exercises provided during therapy sessions    Assessment   Patient presents to the " clinic with moderate symptom irritability pre-session and mild irritability post-session. Manual therapy was performed to Left knee, hip, spine to improve joint play and allow for optimal movement during corrective exercises.   Patient demonstrates Left hip adduction with IR syndrome leading to increased pain in the medial knee and PFJ.   Patient demonstrated improvements in quadriceps motor control within session.     Patient will continue to benefit from skilled outpatient physical therapy to address the deficits listed in the problem list box on initial evaluation, provide patient/family education and to maximize patient's level of independence in the home and community environment.     Willy Is progressing well towards his goals.   Patient prognosis is Good.     Patient's spiritual, cultural and educational needs considered and pt agreeable to plan of care and goals.     Anticipated barriers to physical therapy: None    Goals:   Short Term Goals:  4 weeks  1.Report decreased low back pain  < / =  2/10  to increase tolerance for running. MET  2. Increase ROM by 10 degrees where limited in order to perform ADLs without difficulty.  3. Increase strength by 1/3 MMT grade in deep ER  to increase tolerance for ADL and work activities. MET  4. Pt to tolerate HEP to improve ROM and independence with ADL's MET     Long Term Goals: 8 weeks  1.Report decreased low back pain < / = 0/10  to increase tolerance for tolerance for running 3.5 miles.  2.Patient goal: run 3 miles 2x/week and pilates 1x/week.  3.Increase strength to 4+/5 in deep ER and hip abductors  to increase tolerance for ADL and work activities.  4. Pt will report at 31% score on FOTO  to demonstrate increase in LE function with every day tasks.   Plan   Plan of care Certification: 12/3/2024 to 1/31/2025.     Outpatient Physical Therapy 2 times weekly for 12 weeks to include the following interventions: Cervical/Lumbar Traction, Electrical Stimulation as  needed, Gait Training, Manual Therapy, Moist Heat/ Ice, Neuromuscular Re-ed, Orthotic Management and Training, Patient Education, Self Care, Therapeutic Activities, and Therapeutic Exercise, Blood Flow Restriction Training, Trigger Point Dry Needling as needed.    Valdez Jain PT, DPT  Board Certified Orthopaedic Clinical Specialist

## 2024-12-18 ENCOUNTER — OFFICE VISIT (OUTPATIENT)
Dept: OTOLARYNGOLOGY | Facility: CLINIC | Age: 39
End: 2024-12-18
Payer: COMMERCIAL

## 2024-12-18 VITALS
DIASTOLIC BLOOD PRESSURE: 92 MMHG | HEART RATE: 66 BPM | SYSTOLIC BLOOD PRESSURE: 126 MMHG | WEIGHT: 227.94 LBS | BODY MASS INDEX: 32.71 KG/M2

## 2024-12-18 DIAGNOSIS — J34.2 NASAL SEPTAL DEVIATION: ICD-10-CM

## 2024-12-18 DIAGNOSIS — Z98.890 POST-OPERATIVE STATE: ICD-10-CM

## 2024-12-18 DIAGNOSIS — J34.829 NASAL VALVE COLLAPSE: Primary | ICD-10-CM

## 2024-12-18 PROCEDURE — 99024 POSTOP FOLLOW-UP VISIT: CPT | Mod: S$GLB,,, | Performed by: OTOLARYNGOLOGY

## 2024-12-18 PROCEDURE — 1160F RVW MEDS BY RX/DR IN RCRD: CPT | Mod: CPTII,S$GLB,, | Performed by: OTOLARYNGOLOGY

## 2024-12-18 PROCEDURE — 31231 NASAL ENDOSCOPY DX: CPT | Mod: S$GLB,,, | Performed by: OTOLARYNGOLOGY

## 2024-12-18 PROCEDURE — 1159F MED LIST DOCD IN RCRD: CPT | Mod: CPTII,S$GLB,, | Performed by: OTOLARYNGOLOGY

## 2024-12-18 PROCEDURE — 99999 PR PBB SHADOW E&M-EST. PATIENT-LVL III: CPT | Mod: PBBFAC,,, | Performed by: OTOLARYNGOLOGY

## 2024-12-18 PROCEDURE — 3080F DIAST BP >= 90 MM HG: CPT | Mod: CPTII,S$GLB,, | Performed by: OTOLARYNGOLOGY

## 2024-12-18 PROCEDURE — 3074F SYST BP LT 130 MM HG: CPT | Mod: CPTII,S$GLB,, | Performed by: OTOLARYNGOLOGY

## 2024-12-18 NOTE — PROGRESS NOTES
Four months S/P nasal reconstruction with bilateral auricular cartilage Batten grafts, revision septo,turbs.  Doing well with improved nasal airflow and good valve support, septum straight.  He does still have some sensation of breathing in well through his nose however he feels a valvelike closing when breathing outward more on his left than his right.  Examination with 0 degree scope number 100QGM reveals some anterior septal edema on his left side otherwise his septum is straight with good valve support.  No intranasal masses lesions polyps or purulence noted.  Reviewed post op instructions including continuing nasal saline sprays and begin saline sinus rinses.  RTC in 2 months with his wife or at 8 months for his 1 year visit.

## 2025-01-02 ENCOUNTER — PATIENT MESSAGE (OUTPATIENT)
Dept: SPORTS MEDICINE | Facility: CLINIC | Age: 40
End: 2025-01-02
Payer: COMMERCIAL

## 2025-01-06 ENCOUNTER — CLINICAL SUPPORT (OUTPATIENT)
Dept: REHABILITATION | Facility: OTHER | Age: 40
End: 2025-01-06
Payer: COMMERCIAL

## 2025-01-06 DIAGNOSIS — M54.59 MECHANICAL LOW BACK PAIN: Primary | ICD-10-CM

## 2025-01-06 DIAGNOSIS — G89.29 CHRONIC LEFT SHOULDER PAIN: ICD-10-CM

## 2025-01-06 DIAGNOSIS — M25.561 CHRONIC PAIN OF RIGHT KNEE: ICD-10-CM

## 2025-01-06 DIAGNOSIS — G89.29 CHRONIC PAIN OF RIGHT KNEE: ICD-10-CM

## 2025-01-06 DIAGNOSIS — M25.512 CHRONIC LEFT SHOULDER PAIN: ICD-10-CM

## 2025-01-06 DIAGNOSIS — R52 PAIN AGGRAVATED BY LIFTING: ICD-10-CM

## 2025-01-06 PROCEDURE — 97530 THERAPEUTIC ACTIVITIES: CPT | Mod: PN

## 2025-01-06 PROCEDURE — 97112 NEUROMUSCULAR REEDUCATION: CPT | Mod: PN

## 2025-01-06 PROCEDURE — 97140 MANUAL THERAPY 1/> REGIONS: CPT | Mod: PN

## 2025-01-06 NOTE — PATIENT INSTRUCTIONS
"Access Code: [email or web address]  URL: [email or web address]  Date: 01/06/2025  Prepared by: Valdez Jain    Exercises  - Sidelying Hip External Rotation on Table  - 3-4 x daily - 5-7 x weekly - 3 sets - 10 reps - 3" hold  - Quadruped Rocking Backward  - 1-2 x daily - 5-7 x weekly - 3 sets - 10 reps - 3-5 seconds hold  - Standing Hip Abduction with Bent Knee  - 3-4 x daily - 5-7 x weekly - 3 sets - 10 reps - 3" hold  - Overhead Shoulder Shrugs at Wall  - 3-4 x daily - 5-7 x weekly - 2 sets - 10 reps - 3" hold  - Supine Double Leg Lift  - 3-4 x daily - 5-7 x weekly - 1-3 sets - 8-10 reps - 3" hold  - Sidelying Hip Abduction on Wall  - 3-4 x daily - 5-7 x weekly - 1-3 sets - 8-10 reps - 3" hold  - Standard Lunge  - 3-4 x daily - 5-7 x weekly - 1-3 sets - 8-10 reps - 3" hold  - Bird Dog  - 3-4 x daily - 5-7 x weekly - 1-3 sets - 8-10 reps - 3" hold  - Side Stepping with Resistance at Ankles  - 3-4 x daily - 5-7 x weekly - 1-3 sets - 8-10 reps - 3" hold  - Forward Monster Walk with Resistance (BKA)  - 3-4 x daily - 5-7 x weekly - 1-3 sets - 8-10 reps - 3" hold  - Squat  - 3-4 x daily - 5-7 x weekly - 1-3 sets - 8-10 reps - 3" hold  - Single leg deadlift  - 3-4 x daily - 5-7 x weekly - 1-3 sets - 8-10 reps - 3" hold  - Deadlift  - 3-4 x daily - 5-7 x weekly - 1-3 sets - 8-10 reps - 3" hold  - Shoulder Overhead Press in Abduction with Dumbbells  - 3-4 x daily - 5-7 x weekly - 1-3 sets - 8-10 reps - 3" hold  - Skip with High Knees  - 3-4 x daily - 5-7 x weekly - 1-3 sets - 8-10 reps - 3" hold  - Running Butt Kicks  - 3-4 x daily - 5-7 x weekly - 1-3 sets - 8-10 reps - 3" hold  - Standing Quadriceps Stretch  - 3-4 x daily - 5-7 x weekly - 1-3 sets - 8-10 reps - 3" hold  - Walking Hamstring Stretch  - 3-4 x daily - 5-7 x weekly - 1-3 sets - 8-10 reps - 3" hold  - Lateral Shuffles  - 3-4 x daily - 5-7 x weekly - 1-3 sets - 8-10 reps - 3" hold  - Supine Deep Neck Flexor Training - Repetitions  - 3-4 x daily - 5-7 x weekly - 1-3 " "sets - 8-10 reps - 3" hold  - Seated Assisted Cervical Rotation with Towel  - 3-4 x daily - 5-7 x weekly - 1-3 sets - 8-10 reps - 3" hold  - Plank with Hip Extension  - 3-4 x daily - 5-7 x weekly - 1-3 sets - 8-10 reps - 3" hold  - Side Plank on Elbow  - 3-4 x daily - 5-7 x weekly - 1-3 sets - 8-10 reps - 3" hold  "

## 2025-01-06 NOTE — PROGRESS NOTES
[facility]   Physical Therapy Treatment Note      Name: Willy Branham  [facility] Number: [id number]    Therapy Diagnosis:   Encounter Diagnoses   Name Primary?    Mechanical low back pain Yes    Chronic pain of right knee     Chronic left shoulder pain     Pain aggravated by lifting          Physician: Paulie Morrison DO    Visit Date: 1/6/2025    Physician Orders: PT Eval and Treat   Medical Diagnosis from Referral: Chronic pain of right knee [M25.561, G89.29], Chronic left shoulder pain [M25.512, G89.29], Mechanical low back pain [M54.59]   Evaluation Date: 12/3/2024  Authorization Period Expiration: 12/31/2024  Plan of Care Expiration: 1/31/2025  Visit # / Visits authorized: 1/20 (updated)  FOTO 1st follow up: complete  FOTO 2nd follow up:    PTA Visit #: 0/5     Precautions: Standard    Time In: 8:30 am  Time Out: 9:29 am  Total Appointment Time: 59 minutes    Subjective     Patient reports: doing well today.   He was compliant with home exercise program.  Response to previous treatment: improved mobility   Functional change: ongoing    Pain:  Current 2/10, worst 4/10, best 2/10   Location: right, left lateral knee and bilateral low back, upper back    Objective    Asterisk Signs:   Observation: patient with decreased glute mass and Left quadriceps atrophy     Posture:  Right shoulder downward rotation, anterior glide, lumbar hinge points at L2/L3, slight increase in kyphosis     Gait: decreased thoracic rotation, increased lumbar rotation, decreased right arm swing, Left tibial external rotation and genu varum posture      Functional Movements:   Functional Squat: 10 reps, tibial external rotation on the Left, decreased dorsiflexion bilateral     Single Leg Squat (L3/L4):   - Right 3 reps, adduction/IR  - Left 3 reps, adduction/IR      Heel Walks (L4/L5): 20 yards     Single Leg Heel Raise  Right 16 reps   Left 18 reps       Single Leg Stance:   - Right 30 seconds   Quality: hip  adduction and pelvic drop  - Left 30 seconds   Quality:  hip adduction and pelvic drop        Thoraco-Lumbar Range of Motion:     Limitations (%) Pain Observation   Flexion 5%    No  Hip hinge dominant   Extension 15%    YES   Left L4   Left Side Bending 10% YES   Left L4   Right Side Bending 10% NO      Left Rotation 10% NO      Right Rotation 10% YES             Lower Extremity Strength  Right LE   Left LE     Quadriceps: 76.9# Quadriceps: 70#   Hamstrings: 33.7# Hamstrings: 48.1#   Iliospoas: 4/5 Iliospoas: 4/5   Hip extension:  4-/5 Hip extension: 4-/5   PGM: 45.6# PGM: 49.3#   Hip ER:  3+/5 Hip ER: 4/5   Hip IR: 4/5 Hip IR: 4/5      Special Tests:    Observation/Result   Repeated Flexion -   Repeated Extension -   Sacral Rocking + L3/4   Lumbar Compression Test + L2/3   Lumbar Protective Mechanism + delayed   Prone LE Extension + poor timing      Sacroiliac Joint Screen  SIJ  Right  Left    Thigh Thrust - -   Distraction - -   Sacral Thrust - -   SIJ Mobility       Flick Test - -   Standing Flexion - -      Hip Screen  HIP Right  Left    ROC - -   NAVEEN - -   Hip Scour - -      Hip Abduction Test:   - Right 1/3  - Left 1/3      Positive finding: inability to control pelvis in the frontal plain.    0 = no signs of poor movement control  1 = minimal loss of control as noted through signs of wobble  2 = moderate loss of control with at least 2 deviations  3 = severe loss of control with at least 3 deviations and requires hand on mat to stabilize     Movement System Impairment Screen:   MSI Observation    Bent Knee Fall Out +   Prone Knee Bend + hyperextension   Alternating March -   Hand Heel Rock Relief and stretch       Neural Tension Testing:   Slump: (+) relevant  SLR: -     Mobility Testing:  -Shear testing:   (+) L2/L3  -Segmental mobility testing:  Limited L3/4        Palpation:   -Erector Spinae: Tonic  -Multifidi activation: poor     Flexibility: +              Ely's test: R = - ; L = +               "Hamstrings: R = - ; L = +              Wilmna test: R = - ; L = -    Objective Measures updated at progress report unless specified.     Treatment     Willy received the treatments listed below:      manual therapy techniques: Joint mobilizations were applied to the: Spine/Hip for 10 minutes, including:  After explaining to the patient the effects HVLAT on joint mobility and muscular tension, and the precautions, patient was placed in supine with associated joint safely locked out and a gentle thrust was imparted to Right C5/6 with notable cavitation.  Noted that patient improvement in c-spine AROM and Right neck pain  Left L3/4 HVLA  Supine Thoracic HVLA  Bilateral Hip HVLA  Left Knee gapping grade IV    neuromuscular re-education activities to improve: Balance, Coordination, Kinesthetic, Sense, Proprioception, and Posture for 23 minutes. The following activities were included:  - Sidelying Hip External Rotation 25 reps  - Quadruped Rocking Backward 20x's   - Standing Hip Abduction with Bent Knee 30x's    - Overhead Shoulder Shrugs at Wall 20x's  - Knee Extension Isometrics 5 x 30" hold (Tindeq)    therapeutic activities to improve functional performance for 23  minutes, including:  - Wall Lunges 3 x 8 reps   - Leg Lowering 3 x 5 reps   - Single Leg Squats 3 x 3 reps   - Plank with Hip Extension 2 x 10 reps       Patient Education and Home Exercises       Education provided:   - Patient was provided education on for continued compliance with HEP for continued functional mobility and strength gains for return to prior level of function.      Written Home Exercises Provided: Patient instructed to cont prior HEP. Exercises were reviewed and Willy was able to demonstrate them prior to the end of the session.  Willy demonstrated good  understanding of the education provided. See Electronic Medical Record under Patient Instructions for exercises provided during therapy sessions    Assessment   Patient presents to " the clinic with no symptom irritability pre-session and no irritability post-session. Manual therapy was performed to cervical spine, thoracic spine, hips, and Left knee to improve joint play and allow for optimal movement during corrective exercises.   Patient demonstrates notable improvement in mobility and spine motor control with functional movement patterns.   Patient demonstrated improvements in DNF motor control within session.   HEP for the next 3-4 weeks with follow up in February.   Patient will continue to benefit from skilled outpatient physical therapy to address the deficits listed in the problem list box on initial evaluation, provide patient/family education and to maximize patient's level of independence in the home and community environment.     Willy Is progressing well towards his goals.   Patient prognosis is Good.     Patient's spiritual, cultural and educational needs considered and pt agreeable to plan of care and goals.     Anticipated barriers to physical therapy: None    Goals:   Short Term Goals:  4 weeks  1.Report decreased low back pain  < / =  2/10  to increase tolerance for running. MET  2. Increase ROM by 10 degrees where limited in order to perform ADLs without difficulty.  3. Increase strength by 1/3 MMT grade in deep ER  to increase tolerance for ADL and work activities. MET  4. Pt to tolerate HEP to improve ROM and independence with ADL's MET     Long Term Goals: 8 weeks  1.Report decreased low back pain < / = 0/10  to increase tolerance for tolerance for running 3.5 miles.  2.Patient goal: run 3 miles 2x/week and pilates 1x/week.  3.Increase strength to 4+/5 in deep ER and hip abductors  to increase tolerance for ADL and work activities.  4. Pt will report at 31% score on FOTO  to demonstrate increase in LE function with every day tasks.   Plan   Plan of care Certification: 12/3/2024 to 1/31/2025.     Outpatient Physical Therapy 2 times weekly for 12 weeks to include the  following interventions: Cervical/Lumbar Traction, Electrical Stimulation as needed, Gait Training, Manual Therapy, Moist Heat/ Ice, Neuromuscular Re-ed, Orthotic Management and Training, Patient Education, Self Care, Therapeutic Activities, and Therapeutic Exercise, Blood Flow Restriction Training, Trigger Point Dry Needling as needed.    Valdez Jain PT, DPT  Board Certified Orthopaedic Clinical Specialist

## (undated) DEVICE — GLOVE BIOGEL ECLIPSE SZ 7.5

## (undated) DEVICE — SPONGE PATTY SURGICAL .5X3IN

## (undated) DEVICE — SPONGE COTTON TRAY 4X4IN

## (undated) DEVICE — ADHESIVE MASTISOL VIAL 48/BX

## (undated) DEVICE — SPONGE GELFOAM 12-7MM

## (undated) DEVICE — PENCIL ELECTROSURG HOLST W/BLD

## (undated) DEVICE — APPLICATOR STERILE 6IN 100/CA

## (undated) DEVICE — DRAPE STERI INSTRUMENT 1018

## (undated) DEVICE — DRAPE STERI-DRAPE 1000 17X11IN

## (undated) DEVICE — ELECTRODE REM PLYHSV RETURN 9

## (undated) DEVICE — GOWN POLY REINF BRTH SLV XL

## (undated) DEVICE — DRAPE INSTR MAGNETIC 10X16IN

## (undated) DEVICE — ELECTRODE NEEDLE 1IN

## (undated) DEVICE — SUT ETHILON 4-0 PS2 18 BLK

## (undated) DEVICE — MARKER FN REG DUAL UTIL RULER

## (undated) DEVICE — BLADE INFERIOR TURBINATE 5/PK

## (undated) DEVICE — Device

## (undated) DEVICE — SUT 5-0 CHROMIC GUT / P-3

## (undated) DEVICE — TOWEL OR XRAY BLUE 17X26IN

## (undated) DEVICE — SUT MCRYL PLUS 4-0 PS2 27IN